# Patient Record
Sex: FEMALE | Race: BLACK OR AFRICAN AMERICAN | NOT HISPANIC OR LATINO | URBAN - METROPOLITAN AREA
[De-identification: names, ages, dates, MRNs, and addresses within clinical notes are randomized per-mention and may not be internally consistent; named-entity substitution may affect disease eponyms.]

---

## 2024-10-18 ENCOUNTER — EMERGENCY (EMERGENCY)
Facility: HOSPITAL | Age: 34
LOS: 1 days | Discharge: HOME | End: 2024-10-18
Attending: EMERGENCY MEDICINE | Admitting: EMERGENCY MEDICINE
Payer: COMMERCIAL

## 2024-10-18 VITALS
RESPIRATION RATE: 16 BRPM | DIASTOLIC BLOOD PRESSURE: 76 MMHG | HEART RATE: 58 BPM | SYSTOLIC BLOOD PRESSURE: 110 MMHG | OXYGEN SATURATION: 99 % | TEMPERATURE: 98 F

## 2024-10-18 VITALS
SYSTOLIC BLOOD PRESSURE: 115 MMHG | WEIGHT: 147.93 LBS | HEART RATE: 70 BPM | HEIGHT: 72 IN | TEMPERATURE: 98 F | RESPIRATION RATE: 16 BRPM | OXYGEN SATURATION: 97 % | DIASTOLIC BLOOD PRESSURE: 72 MMHG

## 2024-10-18 LAB
ALBUMIN SERPL ELPH-MCNC: 3.8 G/DL — SIGNIFICANT CHANGE UP (ref 3.3–5)
ALP SERPL-CCNC: 65 U/L — SIGNIFICANT CHANGE UP (ref 40–120)
ALT FLD-CCNC: 21 U/L — SIGNIFICANT CHANGE UP (ref 4–33)
ANION GAP SERPL CALC-SCNC: 11 MMOL/L — SIGNIFICANT CHANGE UP (ref 7–14)
APPEARANCE UR: ABNORMAL
AST SERPL-CCNC: 20 U/L — SIGNIFICANT CHANGE UP (ref 4–32)
BACTERIA # UR AUTO: ABNORMAL /HPF
BASOPHILS # BLD AUTO: 0.03 K/UL — SIGNIFICANT CHANGE UP (ref 0–0.2)
BASOPHILS NFR BLD AUTO: 0.4 % — SIGNIFICANT CHANGE UP (ref 0–2)
BILIRUB SERPL-MCNC: 0.6 MG/DL — SIGNIFICANT CHANGE UP (ref 0.2–1.2)
BILIRUB UR-MCNC: NEGATIVE — SIGNIFICANT CHANGE UP
BUN SERPL-MCNC: 11 MG/DL — SIGNIFICANT CHANGE UP (ref 7–23)
CALCIUM SERPL-MCNC: 9.7 MG/DL — SIGNIFICANT CHANGE UP (ref 8.4–10.5)
CAST: 0 /LPF — SIGNIFICANT CHANGE UP (ref 0–4)
CHLORIDE SERPL-SCNC: 102 MMOL/L — SIGNIFICANT CHANGE UP (ref 98–107)
CO2 SERPL-SCNC: 22 MMOL/L — SIGNIFICANT CHANGE UP (ref 22–31)
COLOR SPEC: YELLOW — SIGNIFICANT CHANGE UP
CREAT SERPL-MCNC: 0.61 MG/DL — SIGNIFICANT CHANGE UP (ref 0.5–1.3)
DIFF PNL FLD: NEGATIVE — SIGNIFICANT CHANGE UP
EGFR: 121 ML/MIN/1.73M2 — SIGNIFICANT CHANGE UP
EOSINOPHIL # BLD AUTO: 0.18 K/UL — SIGNIFICANT CHANGE UP (ref 0–0.5)
EOSINOPHIL NFR BLD AUTO: 2.4 % — SIGNIFICANT CHANGE UP (ref 0–6)
GLUCOSE SERPL-MCNC: 81 MG/DL — SIGNIFICANT CHANGE UP (ref 70–99)
GLUCOSE UR QL: NEGATIVE MG/DL — SIGNIFICANT CHANGE UP
HCG SERPL-ACNC: SIGNIFICANT CHANGE UP MIU/ML
HCT VFR BLD CALC: 34.4 % — LOW (ref 34.5–45)
HGB BLD-MCNC: 11.9 G/DL — SIGNIFICANT CHANGE UP (ref 11.5–15.5)
IANC: 4.12 K/UL — SIGNIFICANT CHANGE UP (ref 1.8–7.4)
IMM GRANULOCYTES NFR BLD AUTO: 0.3 % — SIGNIFICANT CHANGE UP (ref 0–0.9)
KETONES UR-MCNC: NEGATIVE MG/DL — SIGNIFICANT CHANGE UP
LEUKOCYTE ESTERASE UR-ACNC: NEGATIVE — SIGNIFICANT CHANGE UP
LYMPHOCYTES # BLD AUTO: 2.07 K/UL — SIGNIFICANT CHANGE UP (ref 1–3.3)
LYMPHOCYTES # BLD AUTO: 27.7 % — SIGNIFICANT CHANGE UP (ref 13–44)
MAGNESIUM SERPL-MCNC: 2 MG/DL — SIGNIFICANT CHANGE UP (ref 1.6–2.6)
MCHC RBC-ENTMCNC: 27 PG — SIGNIFICANT CHANGE UP (ref 27–34)
MCHC RBC-ENTMCNC: 34.6 GM/DL — SIGNIFICANT CHANGE UP (ref 32–36)
MCV RBC AUTO: 78 FL — LOW (ref 80–100)
MONOCYTES # BLD AUTO: 1.06 K/UL — HIGH (ref 0–0.9)
MONOCYTES NFR BLD AUTO: 14.2 % — HIGH (ref 2–14)
NEUTROPHILS # BLD AUTO: 4.12 K/UL — SIGNIFICANT CHANGE UP (ref 1.8–7.4)
NEUTROPHILS NFR BLD AUTO: 55 % — SIGNIFICANT CHANGE UP (ref 43–77)
NITRITE UR-MCNC: NEGATIVE — SIGNIFICANT CHANGE UP
NRBC # BLD: 0 /100 WBCS — SIGNIFICANT CHANGE UP (ref 0–0)
NRBC # FLD: 0 K/UL — SIGNIFICANT CHANGE UP (ref 0–0)
PH UR: 5.5 — SIGNIFICANT CHANGE UP (ref 5–8)
PHOSPHATE SERPL-MCNC: 4.6 MG/DL — HIGH (ref 2.5–4.5)
PLATELET # BLD AUTO: 377 K/UL — SIGNIFICANT CHANGE UP (ref 150–400)
POTASSIUM SERPL-MCNC: 4 MMOL/L — SIGNIFICANT CHANGE UP (ref 3.5–5.3)
POTASSIUM SERPL-SCNC: 4 MMOL/L — SIGNIFICANT CHANGE UP (ref 3.5–5.3)
PROT SERPL-MCNC: 6.5 G/DL — SIGNIFICANT CHANGE UP (ref 6–8.3)
PROT UR-MCNC: NEGATIVE MG/DL — SIGNIFICANT CHANGE UP
RBC # BLD: 4.41 M/UL — SIGNIFICANT CHANGE UP (ref 3.8–5.2)
RBC # FLD: 14.3 % — SIGNIFICANT CHANGE UP (ref 10.3–14.5)
RBC CASTS # UR COMP ASSIST: 1 /HPF — SIGNIFICANT CHANGE UP (ref 0–4)
SODIUM SERPL-SCNC: 135 MMOL/L — SIGNIFICANT CHANGE UP (ref 135–145)
SP GR SPEC: 1.02 — SIGNIFICANT CHANGE UP (ref 1–1.03)
SQUAMOUS # UR AUTO: 11 /HPF — HIGH (ref 0–5)
UROBILINOGEN FLD QL: 0.2 MG/DL — SIGNIFICANT CHANGE UP (ref 0.2–1)
WBC # BLD: 7.48 K/UL — SIGNIFICANT CHANGE UP (ref 3.8–10.5)
WBC # FLD AUTO: 7.48 K/UL — SIGNIFICANT CHANGE UP (ref 3.8–10.5)
WBC UR QL: 2 /HPF — SIGNIFICANT CHANGE UP (ref 0–5)

## 2024-10-18 PROCEDURE — 76705 ECHO EXAM OF ABDOMEN: CPT | Mod: 26

## 2024-10-18 PROCEDURE — 99284 EMERGENCY DEPT VISIT MOD MDM: CPT

## 2024-10-18 PROCEDURE — 76817 TRANSVAGINAL US OBSTETRIC: CPT | Mod: 26

## 2024-10-18 RX ORDER — ACETAMINOPHEN 325 MG
1000 TABLET ORAL ONCE
Refills: 0 | Status: COMPLETED | OUTPATIENT
Start: 2024-10-18 | End: 2024-10-18

## 2024-10-18 RX ADMIN — Medication 400 MILLIGRAM(S): at 17:19

## 2024-10-18 NOTE — ED PROVIDER NOTE - PATIENT PORTAL LINK FT
You can access the FollowMyHealth Patient Portal offered by [facility] by registering at the following website: [email or web address]. By joining [employer]’s FollowMyHealth portal, you will also be able to view your health information using other applications (apps) compatible with our system.

## 2024-10-18 NOTE — ED PROVIDER NOTE - PROGRESS NOTE DETAILS
exam chaperoned by SHITAL Harrell.  Liberty Turk PGY1 Patient reassessed.  Patient endorsing reduced pelvic pain.  Patient endorses an appetite.  Patient without signs of anemia, infection, or electrolyte derangements on her blood work.  Patient with large right sided subserosal fibroid likely contributing to patient's pelvic pain.  As patient does not have a fever, decreased appetite/lack of appetite or leukocytosis low suspicion for appendicitis, recognize that appendix was not able to be visualized on ultrasound however shared decision making and have decided pt will not require further imaging/MRI to rule out appendicitis.  Results discussed with patient. Pt is afebrile currently, vitals are stable. Pt was educated on outpatient treatment, follow up and return precautions. Shared decision making performed. Pt okay for DC home at this time. Questions answered. Pt understands and agrees with the plan for discharge home. Pt has access to appropriate follow up.   Liberty Turk PGY1

## 2024-10-18 NOTE — ED PROVIDER NOTE - OBJECTIVE STATEMENT
33 y/o F PMHx of asthma, fibroids,  9 wk 2 days presenting with sudden onset right lower quadrant and right pelvic pain that began around 1 pm this afternoon. LMP 24, RANCHO 25 Pt denies fever, decreased PO intake, nausea, vomiting, vaginal bleeding or discharge, hx of STI, CP, SOB, dizziness, 33 y/o F PMHx of asthma, fibroids,  9 wk 2 days presenting with sudden onset of constant right lower quadrant and right pelvic pain that began around 1 pm this afternoon. LMP 24, RANCHO 25 Pt denies fever, decreased PO intake, nausea, vomiting, vaginal bleeding or discharge, hx of STI, CP, SOB, dizziness, dysuria, and hematuria. 35 y/o F PMHx of asthma, fibroids,  9 wk 2 days presenting with sudden onset of constant right lower quadrant and right pelvic pain that began around 1 pm this afternoon. LMP 24, RANCHO 25 Pt denies fever, decreased PO intake, nausea, vomiting, vaginal bleeding or discharge, hx of STI, CP, SOB, dizziness, dysuria, and hematuria.    Attendinyo female presents with right sided pelvic pain which was sudden onset this afternoon.  no fever or chills.  has had decreased appetite for awhile but pt is pregnant in the first trimester.

## 2024-10-18 NOTE — ED ADULT TRIAGE NOTE - WEIGHT METHOD
Instructions: This plan will send the code FBSE to the PM system.  DO NOT or CHANGE the price.
Price (Do Not Change): 0.00
Detail Level: Simple
stated

## 2024-10-18 NOTE — ED ADULT TRIAGE NOTE - NS ED TRIAGE AVPU SCALE
Alert-The patient is alert, awake and responds to voice. The patient is oriented to time, place, and person. The triage nurse is able to obtain subjective information. occasional use

## 2024-10-18 NOTE — ED PROVIDER NOTE - CLINICAL SUMMARY MEDICAL DECISION MAKING FREE TEXT BOX
Right lower abdominal pain.  will get US of the appendix to evaluate for appendicitis.  will get transvaginal US to evaluate the fetus and to evaluate for ovarian torsion.  will treat pain and reassess.

## 2024-10-18 NOTE — ED ADULT TRIAGE NOTE - CHIEF COMPLAINT QUOTE
c/o intermittent sharp pains to right pelvic area. Pt is 9 wks pregnant, LMP 24. . Denies any vaginal bleeding or abnormal discharge. Phx fibroids.

## 2024-10-18 NOTE — ED ADULT NURSE NOTE - NSFALLUNIVINTERV_ED_ALL_ED
Bed/Stretcher in lowest position, wheels locked, appropriate side rails in place/Call bell, personal items and telephone in reach/Instruct patient to call for assistance before getting out of bed/chair/stretcher/Non-slip footwear applied when patient is off stretcher/[place] to call system/Physically safe environment - no spills, clutter or unnecessary equipment/Purposeful proactive rounding/Room/bathroom lighting operational, light cord in reach

## 2024-10-18 NOTE — ED ADULT NURSE NOTE - DOES PATIENT HAVE ADVANCE DIRECTIVE
Patient Instructions by Zeferino Culp DO at 05/02/17 11:58 AM     Author:  Zeferino Culp DO Service:  (none) Author Type:  Physician     Filed:  05/02/17 11:59 AM Encounter Date:  5/2/2017 Status:  Signed     :  Zeferino Culp DO (Physician)            PATIENT INFORMATION   .    Follow-Up  -- Make an appointment with Zeferino Culp DO in one month  -- Talk to Cardiologist about midodrine dosing  -- WIll send records to Dr Lee    Additional Educational Resources:  For additional resources regarding your symptoms, diagnosis, or further health information, please visit the Health Resources section on [email or web address] or the Online Health Resources section in [employer].            Revision History        User Key Date/Time User Provider Type Action    > [N/A] 05/02/17 11:59 AM Zeferino Culp DO Physician Sign            
Yes

## 2024-10-18 NOTE — ED PROVIDER NOTE - NSFOLLOWUPINSTRUCTIONS_ED_ALL_ED_FT
Thank you for coming to the Emergency Department.    You were seen today for pelvic pain. We obtained lab work and imaging to help determine what was causing your symptoms. On ultrasound you were found to have a trace subchorionic hemorrhage as well as a large subserosal right fundal fibroid.       Experience some vaginal spotting as a result of this.  Reach out to your OB/GYN or come to the emergency department if you experience this.  Avoid heavy lifting, exertional physical activity, sexual activity until your OB/GYN has improved.    Based on our examination we have determined that there is no immediate danger to your health at this time.    We would like you to follow up with your OBGYN within 1 week.     You can take Tylenol for pain.     Acetaminophen 650mg every 6 hours as needed for pain.    Please do not exceed 4,000 mg of acetaminophen during a 24 hours period.  Acetaminophen can be found in many over-the-counter cold medications as well as  opioid medications that may be given for pain.     PLEASE RETURN TO THE EMERGENCY DEPARTMENT and call 911 IF YOU EXPERIENCE ANY OF THE FOLLOWING SYMPTOMS: Vaginal bleeding, worsening pelvic pain that does not resolve with Tylenol, fevers, burning with urination.

## 2024-10-18 NOTE — ED ADULT NURSE NOTE - OBJECTIVE STATEMENT
Pt received in Intake 8 . Fiance at bedside. Pt calm pleasant affect. Pt st" I have this pain in rt lower abd that came on suddenly this afternoon....it is a constant , sharp pain. I am pregnant had ultrasound done  10/7 I was 7 weeks pregnant according to ultrasound. My last cycle was 8/14. I have no bleeding. No unusually nausea/no vomiting."  Urine cup provide. Pt positioned for comfort. vss . Further eval to follow.

## 2024-10-18 NOTE — ED ADULT NURSE REASSESSMENT NOTE - NS ED NURSE REASSESS COMMENT FT1
IV initiated on right AC g 20 blood work sent to lab, due meds given. Patient taken to ultrasound per wheelchair.

## 2024-10-19 LAB
CULTURE RESULTS: SIGNIFICANT CHANGE UP
SPECIMEN SOURCE: SIGNIFICANT CHANGE UP

## 2024-12-31 ENCOUNTER — OUTPATIENT (OUTPATIENT)
Dept: INPATIENT UNIT | Facility: HOSPITAL | Age: 34
LOS: 1 days | Discharge: HOME | End: 2024-12-31
Payer: COMMERCIAL

## 2024-12-31 ENCOUNTER — EMERGENCY (EMERGENCY)
Facility: HOSPITAL | Age: 34
LOS: 1 days | Discharge: STILL ADMITTED OR ADMINISTRATIVE | End: 2024-12-31
Admitting: EMERGENCY MEDICINE
Payer: COMMERCIAL

## 2024-12-31 VITALS
SYSTOLIC BLOOD PRESSURE: 104 MMHG | DIASTOLIC BLOOD PRESSURE: 75 MMHG | TEMPERATURE: 98 F | RESPIRATION RATE: 16 BRPM | HEART RATE: 96 BPM

## 2024-12-31 VITALS
HEART RATE: 71 BPM | DIASTOLIC BLOOD PRESSURE: 77 MMHG | TEMPERATURE: 98 F | SYSTOLIC BLOOD PRESSURE: 107 MMHG | RESPIRATION RATE: 16 BRPM | OXYGEN SATURATION: 100 % | WEIGHT: 154.98 LBS

## 2024-12-31 DIAGNOSIS — O26.899 OTHER SPECIFIED PREGNANCY RELATED CONDITIONS, UNSPECIFIED TRIMESTER: ICD-10-CM

## 2024-12-31 DIAGNOSIS — L98.9 DISORDER OF THE SKIN AND SUBCUTANEOUS TISSUE, UNSPECIFIED: Chronic | ICD-10-CM

## 2024-12-31 LAB
APPEARANCE UR: CLEAR — SIGNIFICANT CHANGE UP
BILIRUB UR-MCNC: NEGATIVE — SIGNIFICANT CHANGE UP
COLOR SPEC: YELLOW — SIGNIFICANT CHANGE UP
DIFF PNL FLD: NEGATIVE — SIGNIFICANT CHANGE UP
GLUCOSE UR QL: NEGATIVE MG/DL — SIGNIFICANT CHANGE UP
KETONES UR-MCNC: ABNORMAL MG/DL
LEUKOCYTE ESTERASE UR-ACNC: NEGATIVE — SIGNIFICANT CHANGE UP
NITRITE UR-MCNC: NEGATIVE — SIGNIFICANT CHANGE UP
PH UR: 5.5 — SIGNIFICANT CHANGE UP (ref 5–8)
PROT UR-MCNC: NEGATIVE MG/DL — SIGNIFICANT CHANGE UP
SP GR SPEC: 1.02 — SIGNIFICANT CHANGE UP (ref 1–1.03)
UROBILINOGEN FLD QL: 0.2 MG/DL — SIGNIFICANT CHANGE UP (ref 0.2–1)

## 2024-12-31 PROCEDURE — 99221 1ST HOSP IP/OBS SF/LOW 40: CPT

## 2024-12-31 PROCEDURE — 99283 EMERGENCY DEPT VISIT LOW MDM: CPT

## 2024-12-31 RX ORDER — ALBUTEROL SULFATE 90 UG/1
0 INHALANT RESPIRATORY (INHALATION)
Refills: 0 | DISCHARGE

## 2024-12-31 RX ORDER — SODIUM CHLORIDE 9 MG/ML
1000 INJECTION, SOLUTION INTRAVENOUS ONCE
Refills: 0 | Status: COMPLETED | OUTPATIENT
Start: 2024-12-31 | End: 2024-12-31

## 2024-12-31 RX ADMIN — SODIUM CHLORIDE 1000 MILLILITER(S): 9 INJECTION, SOLUTION INTRAVENOUS at 22:57

## 2024-12-31 NOTE — OB PROVIDER TRIAGE NOTE - HISTORY OF PRESENT ILLNESS
34 year old female  @ 19.6GA with SLIUP, hx fibroids, presents to triage c/o contractions. pt reports contractions since yesterday - described as tightness, about q 10 minutes, rated 9/10 in pain, defers pain meds.  reports feeling FM. denies vaginal bleeding, leakage of fluid.   Denies fever, chills, headaches, changes in vision, chest pain, palpitations, shortness of breath, cough, nausea, vomiting, diarrhea, constipation, urinary symptoms, edema.   PNC with Dr Escudero - [facility]. last visit 24. next visit 25.  PNC complications   1. fibroids - uncomplicated  2. asthma - last symptoms/albuterol use 10/2024. denies relate hospitalizations/intubations.     HIE 10/18/24  Multiple small intramural fibroids. A subserosal right fundal fibroid of 5.2 x 4.9 x 5 cm.     34 year old female  @ 19.6GA with SLIUP, hx fibroids, presents to triage c/o contractions. pt reports contractions since yesterday - described as tightness worse on right side, about q 10 minutes, rated 9/10 in pain, defers pain meds.  reports feeling FM. denies vaginal bleeding, leakage of fluid.   Denies fever, chills, headaches, changes in vision, chest pain, palpitations, shortness of breath, cough, nausea, vomiting, diarrhea, constipation, urinary symptoms, edema.   PNC with Dr Escudero - [facility]. last visit 24. next visit 25.  PNC complications   1. fibroids - uncomplicated  2. asthma - last symptoms/albuterol use 10/2024. denies relate hospitalizations/intubations.     HIE 10/18/24  Multiple small intramural fibroids. A subserosal right fundal fibroid of 5.2 x 4.9 x 5 cm.

## 2024-12-31 NOTE — OB PROVIDER TRIAGE NOTE - ADDITIONAL INSTRUCTIONS
Please follow up with your OB provider within 1 week. You may take Tylenol, Benadryl as needed for pain

## 2024-12-31 NOTE — ED PROVIDER NOTE - CLINICAL SUMMARY MEDICAL DECISION MAKING FREE TEXT BOX
Attending Dr. CARRILLO:  Pt was seen and had a medical screening exam prior to disposition to L&D. Pt is a 34 female , 20 weeks gestation, who presented to the ED for pelvic pain since yesterday. No acute concerns at this time with no signs currently of imminent delivery or hemorrhage. L&D notified and will discharge directly into their care for continued evaluation and definitive care. Patient brought by staff to L&D.

## 2024-12-31 NOTE — OB PROVIDER TRIAGE NOTE - NSOBPROVIDERNOTE_OBGYN_ALL_OB_FT
34 year old female  @ 19.6GA with SLIUP, hx fibroids, presents to triage c/o contractions    UA sent  TOCO  TAS - anterior, breech, GFM, m mode 133 bpm, MVP 4.3   Fibroid on right fundal, 5.19 x 4.4cm  SSE cervix appears closed. no VB/LOF.  TVS 3.75-4.04cm, no funneling/dynamic changes 34 year old female  @ 19.6GA with SLIUP, hx fibroids, presents to triage c/o contractions    UA sent  TOCO irritability vs irreg contractions  TAS - anterior, breech, GFM, m mode 133 bpm, MVP 4.3   Fibroid on right fundal, 5.19 x 4.4cm  SSE cervix appears closed. no VB/LOF.  TVS 3.75-4.04cm, no funneling/dynamic changes    d/w Dr Baugh Safety Attending in triage, reviewed plan of care: give IV LR bolus 34 year old female  @ 19.6GA with SLIUP, hx fibroids, presents to triage c/o contractions    UA sent  TOCO irritability vs irreg contractions  TAS - anterior, breech, GFM, m mode 133 bpm, MVP 4.3   Fibroid on right fundal, 5.19 x 4.4cm  SSE cervix appears closed. no VB/LOF.  TVS 3.75-4.04cm, no funneling/dynamic changes    d/w Dr Baugh Safety Attending in triage, reviewed plan of care: give IV LR bolus   - discussed indocin with patient. pt considering 34 year old female  @ 19.6GA with SLIUP, hx fibroids, presents to triage c/o contractions    UA sent  TOCO irritability vs irreg contractions  TAS - anterior, breech, GFM, m mode 133 bpm, MVP 4.3   Fibroid on right fundal, 5.19 x 4.4cm  SSE cervix appears closed. no VB/LOF.  TVS 3.75-4.04cm, no funneling/dynamic changes    d/w Dr Baugh Safety Attending in triage, reviewed plan of care: give IV LR bolus   - discussed indocin with patient. pt considering    Care taken over by Dr. Baugh  -pt re-evaluated - pt reports feeling intermittent pain but it has improved while lying on her side. Pt initially sleeping  -pt declines indocin  -UA with trace ketones  -toco with improved irritability, no further ctx  -given CL long and visually closed and improved toco, do not suspect PTL   -s/p 1L IVF bolus    Ok to dc to home in stable condition. Recommend patient follow up with OBGYN provider in 1 week    HERB Baugh MD

## 2024-12-31 NOTE — OB PROVIDER TRIAGE NOTE - NSHPPHYSICALEXAM_GEN_ALL_CORE
Vital Signs Last 24 Hrs  T(C): 36.9 (31 Dec 2024 21:27), Max: 36.9 (31 Dec 2024 20:53)  T(F): 98.42 (31 Dec 2024 21:27), Max: 98.42 (31 Dec 2024 21:27)  HR: 67 (31 Dec 2024 22:01) (67 - 96)  BP: 111/76 (31 Dec 2024 22:01) (104/75 - 117/68)  BP(mean): --  RR: 16 (31 Dec 2024 21:27) (16 - 16)  SpO2: 100% (31 Dec 2024 20:18) (100% - 100%)    gen: a/o x 3, NAD  pulm: breathing unlabored on room air  abd: soft and nontender, gravid  neg CAV tenderness  SSE: cervix appears closed. no LOF/VB noted  TVS: cervical length 3.75-4.04cm, no funneling/dynamic changes   SVE: deferred  TAS: limited sonogram -breech by sono. anterior placenta. GFM. m mode 133  bpm. MVP 4.3cm. myoma right fundal 5.19 x 4.4cm. image saved in ASOB  TOCO: irreg contractions Vital Signs Last 24 Hrs  T(C): 36.9 (31 Dec 2024 21:27), Max: 36.9 (31 Dec 2024 20:53)  T(F): 98.42 (31 Dec 2024 21:27), Max: 98.42 (31 Dec 2024 21:27)  HR: 67 (31 Dec 2024 22:01) (67 - 96)  BP: 111/76 (31 Dec 2024 22:01) (104/75 - 117/68)  BP(mean): --  RR: 16 (31 Dec 2024 21:27) (16 - 16)  SpO2: 100% (31 Dec 2024 20:18) (100% - 100%)    gen: a/o x 3, NAD  pulm: breathing unlabored on room air  abd: soft and nontender, gravid. no rebound/guarding noted  neg CAV tenderness  SSE: cervix appears closed. no LOF/VB noted  TVS: cervical length 3.75-4.04cm, no funneling/dynamic changes   SVE: deferred  TAS: limited sonogram -breech by sono. anterior placenta. GFM. m mode 133  bpm. MVP 4.3cm. myoma right fundal 5.19 x 4.4cm. image saved in ASOB  TOCO: irreg contractions

## 2024-12-31 NOTE — ED ADULT TRIAGE NOTE - CHIEF COMPLAINT QUOTE
Pt c/o constant pelvic pain since yesterday. Denies bleeding, vaginal leakage. Pt is 20 weeks pregnant, , RANCHO 25, Dr. Escudero is OB. Pt c/o constant pelvic pain since yesterday. Denies bleeding, vaginal leakage. Pt is 20 weeks pregnant, , RANCHO 25, Dr. Escudero is OB. Dr. Coker evaluated patient in triage and is safe for transport. Spoke to Scott in L&D.

## 2025-01-01 VITALS — DIASTOLIC BLOOD PRESSURE: 69 MMHG | HEART RATE: 82 BPM | SYSTOLIC BLOOD PRESSURE: 119 MMHG

## 2025-01-06 DIAGNOSIS — J45.909 UNSPECIFIED ASTHMA, UNCOMPLICATED: ICD-10-CM

## 2025-01-06 DIAGNOSIS — D25.2 SUBSEROSAL LEIOMYOMA OF UTERUS: ICD-10-CM

## 2025-01-06 DIAGNOSIS — O99.512 DISEASES OF THE RESPIRATORY SYSTEM COMPLICATING PREGNANCY, SECOND TRIMESTER: ICD-10-CM

## 2025-01-06 DIAGNOSIS — Z3A.19 19 WEEKS GESTATION OF PREGNANCY: ICD-10-CM

## 2025-01-06 DIAGNOSIS — O34.12 MATERNAL CARE FOR BENIGN TUMOR OF CORPUS UTERI, SECOND TRIMESTER: ICD-10-CM

## 2025-01-06 DIAGNOSIS — O47.02 FALSE LABOR BEFORE 37 COMPLETED WEEKS OF GESTATION, SECOND TRIMESTER: ICD-10-CM

## 2025-01-10 PROBLEM — J45.909 UNSPECIFIED ASTHMA, UNCOMPLICATED: Chronic | Status: ACTIVE | Noted: 2024-12-31

## 2025-01-10 PROBLEM — Z00.00 ENCOUNTER FOR PREVENTIVE HEALTH EXAMINATION: Status: ACTIVE | Noted: 2025-01-10

## 2025-01-21 ENCOUNTER — ASOB RESULT (OUTPATIENT)
Age: 35
End: 2025-01-21

## 2025-01-21 ENCOUNTER — APPOINTMENT (OUTPATIENT)
Dept: ANTEPARTUM | Facility: CLINIC | Age: 35
End: 2025-01-21
Payer: COMMERCIAL

## 2025-01-21 ENCOUNTER — APPOINTMENT (OUTPATIENT)
Dept: MATERNAL FETAL MEDICINE | Facility: CLINIC | Age: 35
End: 2025-01-21
Payer: COMMERCIAL

## 2025-01-21 VITALS
WEIGHT: 157 LBS | DIASTOLIC BLOOD PRESSURE: 71 MMHG | BODY MASS INDEX: 23.79 KG/M2 | HEIGHT: 68 IN | HEART RATE: 87 BPM | SYSTOLIC BLOOD PRESSURE: 112 MMHG

## 2025-01-21 PROCEDURE — 76811 OB US DETAILED SNGL FETUS: CPT

## 2025-01-21 PROCEDURE — 99204 OFFICE O/P NEW MOD 45 MIN: CPT

## 2025-02-21 ENCOUNTER — ASOB RESULT (OUTPATIENT)
Age: 35
End: 2025-02-21

## 2025-02-21 ENCOUNTER — APPOINTMENT (OUTPATIENT)
Dept: ANTEPARTUM | Facility: CLINIC | Age: 35
End: 2025-02-21

## 2025-02-21 ENCOUNTER — OUTPATIENT (OUTPATIENT)
Dept: INPATIENT UNIT | Facility: HOSPITAL | Age: 35
LOS: 1 days | Discharge: HOME | End: 2025-02-21
Payer: COMMERCIAL

## 2025-02-21 VITALS — OXYGEN SATURATION: 96 % | HEART RATE: 88 BPM

## 2025-02-21 DIAGNOSIS — O26.899 OTHER SPECIFIED PREGNANCY RELATED CONDITIONS, UNSPECIFIED TRIMESTER: ICD-10-CM

## 2025-02-21 DIAGNOSIS — L98.9 DISORDER OF THE SKIN AND SUBCUTANEOUS TISSUE, UNSPECIFIED: Chronic | ICD-10-CM

## 2025-02-21 LAB
ALBUMIN SERPL ELPH-MCNC: 3.2 G/DL — LOW (ref 3.3–5)
ALP SERPL-CCNC: 155 U/L — HIGH (ref 40–120)
ALT FLD-CCNC: 29 U/L — SIGNIFICANT CHANGE UP (ref 4–33)
ANION GAP SERPL CALC-SCNC: 13 MMOL/L — SIGNIFICANT CHANGE UP (ref 7–14)
APPEARANCE UR: ABNORMAL
AST SERPL-CCNC: 35 U/L — HIGH (ref 4–32)
BACTERIA # UR AUTO: NEGATIVE /HPF — SIGNIFICANT CHANGE UP
BASOPHILS # BLD AUTO: 0.02 K/UL — SIGNIFICANT CHANGE UP (ref 0–0.2)
BASOPHILS NFR BLD AUTO: 0.3 % — SIGNIFICANT CHANGE UP (ref 0–2)
BILIRUB SERPL-MCNC: 0.4 MG/DL — SIGNIFICANT CHANGE UP (ref 0.2–1.2)
BILIRUB UR-MCNC: NEGATIVE — SIGNIFICANT CHANGE UP
BUN SERPL-MCNC: 7 MG/DL — SIGNIFICANT CHANGE UP (ref 7–23)
CALCIUM SERPL-MCNC: 9.2 MG/DL — SIGNIFICANT CHANGE UP (ref 8.4–10.5)
CAST: 0 /LPF — SIGNIFICANT CHANGE UP (ref 0–4)
CHLORIDE SERPL-SCNC: 103 MMOL/L — SIGNIFICANT CHANGE UP (ref 98–107)
CO2 SERPL-SCNC: 22 MMOL/L — SIGNIFICANT CHANGE UP (ref 22–31)
COLOR SPEC: YELLOW — SIGNIFICANT CHANGE UP
CREAT SERPL-MCNC: 0.62 MG/DL — SIGNIFICANT CHANGE UP (ref 0.5–1.3)
DIFF PNL FLD: NEGATIVE — SIGNIFICANT CHANGE UP
EGFR: 121 ML/MIN/1.73M2 — SIGNIFICANT CHANGE UP
EOSINOPHIL # BLD AUTO: 0.26 K/UL — SIGNIFICANT CHANGE UP (ref 0–0.5)
EOSINOPHIL NFR BLD AUTO: 3.9 % — SIGNIFICANT CHANGE UP (ref 0–6)
FLUAV AG NPH QL: SIGNIFICANT CHANGE UP
FLUBV AG NPH QL: SIGNIFICANT CHANGE UP
GLUCOSE SERPL-MCNC: 98 MG/DL — SIGNIFICANT CHANGE UP (ref 70–99)
GLUCOSE UR QL: NEGATIVE MG/DL — SIGNIFICANT CHANGE UP
HCT VFR BLD CALC: 25.9 % — LOW (ref 34.5–45)
HGB BLD-MCNC: 8.5 G/DL — LOW (ref 11.5–15.5)
IANC: 4.05 K/UL — SIGNIFICANT CHANGE UP (ref 1.8–7.4)
IMM GRANULOCYTES NFR BLD AUTO: 0.8 % — SIGNIFICANT CHANGE UP (ref 0–0.9)
KETONES UR-MCNC: NEGATIVE MG/DL — SIGNIFICANT CHANGE UP
LEUKOCYTE ESTERASE UR-ACNC: NEGATIVE — SIGNIFICANT CHANGE UP
LYMPHOCYTES # BLD AUTO: 1.42 K/UL — SIGNIFICANT CHANGE UP (ref 1–3.3)
LYMPHOCYTES # BLD AUTO: 21.5 % — SIGNIFICANT CHANGE UP (ref 13–44)
MCHC RBC-ENTMCNC: 24.7 PG — LOW (ref 27–34)
MCHC RBC-ENTMCNC: 32.8 G/DL — SIGNIFICANT CHANGE UP (ref 32–36)
MCV RBC AUTO: 75.3 FL — LOW (ref 80–100)
MONOCYTES # BLD AUTO: 0.81 K/UL — SIGNIFICANT CHANGE UP (ref 0–0.9)
MONOCYTES NFR BLD AUTO: 12.3 % — SIGNIFICANT CHANGE UP (ref 2–14)
NEUTROPHILS # BLD AUTO: 4.05 K/UL — SIGNIFICANT CHANGE UP (ref 1.8–7.4)
NEUTROPHILS NFR BLD AUTO: 61.2 % — SIGNIFICANT CHANGE UP (ref 43–77)
NITRITE UR-MCNC: NEGATIVE — SIGNIFICANT CHANGE UP
NRBC # BLD AUTO: 0 K/UL — SIGNIFICANT CHANGE UP (ref 0–0)
NRBC # FLD: 0 K/UL — SIGNIFICANT CHANGE UP (ref 0–0)
NRBC BLD AUTO-RTO: 0 /100 WBCS — SIGNIFICANT CHANGE UP (ref 0–0)
PH UR: 6 — SIGNIFICANT CHANGE UP (ref 5–8)
PLATELET # BLD AUTO: 409 K/UL — HIGH (ref 150–400)
POTASSIUM SERPL-MCNC: 3.3 MMOL/L — LOW (ref 3.5–5.3)
POTASSIUM SERPL-SCNC: 3.3 MMOL/L — LOW (ref 3.5–5.3)
PROT SERPL-MCNC: 6.6 G/DL — SIGNIFICANT CHANGE UP (ref 6–8.3)
PROT UR-MCNC: SIGNIFICANT CHANGE UP MG/DL
RBC # BLD: 3.44 M/UL — LOW (ref 3.8–5.2)
RBC # FLD: 14.4 % — SIGNIFICANT CHANGE UP (ref 10.3–14.5)
RBC CASTS # UR COMP ASSIST: 1 /HPF — SIGNIFICANT CHANGE UP (ref 0–4)
REVIEW: SIGNIFICANT CHANGE UP
RSV RNA NPH QL NAA+NON-PROBE: SIGNIFICANT CHANGE UP
SARS-COV-2 RNA SPEC QL NAA+PROBE: SIGNIFICANT CHANGE UP
SODIUM SERPL-SCNC: 138 MMOL/L — SIGNIFICANT CHANGE UP (ref 135–145)
SP GR SPEC: 1.02 — SIGNIFICANT CHANGE UP (ref 1–1.03)
SQUAMOUS # UR AUTO: 4 /HPF — SIGNIFICANT CHANGE UP (ref 0–5)
UROBILINOGEN FLD QL: 1 MG/DL — SIGNIFICANT CHANGE UP (ref 0.2–1)
WBC # BLD: 6.61 K/UL — SIGNIFICANT CHANGE UP (ref 3.8–10.5)
WBC # FLD AUTO: 6.61 K/UL — SIGNIFICANT CHANGE UP (ref 3.8–10.5)
WBC UR QL: 1 /HPF — SIGNIFICANT CHANGE UP (ref 0–5)

## 2025-02-21 PROCEDURE — 71045 X-RAY EXAM CHEST 1 VIEW: CPT | Mod: 26

## 2025-02-21 PROCEDURE — 59025 FETAL NON-STRESS TEST: CPT | Mod: 26

## 2025-02-21 PROCEDURE — 93010 ELECTROCARDIOGRAM REPORT: CPT

## 2025-02-21 PROCEDURE — 99221 1ST HOSP IP/OBS SF/LOW 40: CPT | Mod: 25

## 2025-02-21 RX ORDER — DEXTROMETHORPHAN HBR, GUAIFENESIN 200 MG/10ML
200 LIQUID ORAL EVERY 6 HOURS
Refills: 0 | Status: ACTIVE | OUTPATIENT
Start: 2025-02-21 | End: 2026-01-20

## 2025-02-21 RX ORDER — ONDANSETRON HCL/PF 4 MG/2 ML
4 VIAL (ML) INJECTION ONCE
Refills: 0 | Status: ACTIVE | OUTPATIENT
Start: 2025-02-21

## 2025-02-21 RX ORDER — SODIUM CHLORIDE 9 G/1000ML
1000 INJECTION, SOLUTION INTRAVENOUS ONCE
Refills: 0 | Status: ACTIVE | OUTPATIENT
Start: 2025-02-21 | End: 2025-02-21

## 2025-02-21 RX ADMIN — DEXTROMETHORPHAN HBR, GUAIFENESIN 200 MILLIGRAM(S): 200 LIQUID ORAL at 19:34

## 2025-02-21 NOTE — OB PROVIDER TRIAGE NOTE - HISTORY OF PRESENT ILLNESS
PNC with [facility] @ [facility]   35 y/o  @ 27.2 wks gestation presents with c/o flu like symptoms since Noe 2/15/2025 , states has had a productive cough , SOB,  had temp of 101.8 on  and reports no fever today reports generalized body aches , denies any recent sick exposure , pt c/o dizziness x's 1 day denies any fever or chills today pt nausea " on and off" the past   few days denies any uc's vb or lof reports +FM ap care comp by :   anemia- pt to start iron infusions     PNC with [facility] @ [facility]   33 y/o  @ 27.2 wks gestation presents with c/o flu like symptoms since Noe 2/15/2025 , states has had a productive cough states has had thick brown- yellowish sputum x's 3 days  , SOB,  had temp of 101.8 on  and reports no fever today reports generalized body aches , denies any recent sick exposure , pt c/o dizziness x's 1 day denies any fever or chills today pt nausea " on and off" the past   few days denies any uc's vb or lof reports +FM ap care comp by :   anemia- pt to start iron infusions

## 2025-02-21 NOTE — OB PROVIDER TRIAGE NOTE - NS_LMP_OBGYN_ALL_OB_DT
Implemented All Fall with Harm Risk Interventions:  [place] to call system. Call bell, personal items and telephone within reach. Instruct patient to call for assistance. Room bathroom lighting operational. Non-slip footwear when patient is off stretcher. Physically safe environment: no spills, clutter or unnecessary equipment. Stretcher in lowest position, wheels locked, appropriate side rails in place. Provide visual cue, wrist band, yellow gown, etc. Monitor gait and stability. Monitor for mental status changes and reorient to person, place, and time. Review medications for side effects contributing to fall risk. Reinforce activity limits and safety measures with patient and family. Provide visual clues: red socks.
14-Aug-2024

## 2025-02-21 NOTE — OB RN TRIAGE NOTE - CURRENT PREGNANCY COMPLICATIONS, OB PROFILE
Gestational Age less than 36 Weeks/Anemia/Asthma anemia dx'd 2/21/25 to start iron infusion/Gestational Age less than 36 Weeks/Anemia/Asthma

## 2025-02-21 NOTE — OB PROVIDER TRIAGE NOTE - NSHPPHYSICALEXAM_GEN_ALL_CORE
abdomen: soft, nt on palp  lungs: CTAB  heart : RRR  NST in progress abdomen: soft, nt on palp  lungs: CTAB  heart : RRR  NST in progress  109/66 P: 93 R: 18 T: 37.0 abdomen: soft, nt on palp  lungs: CTAB  heart : RRR  NST in progress  109/66 P: 93 R: 18 T: 37.0  EKG :  normal sinus rhythm   normal ECG abdomen: soft, nt on palp  lungs: CTAB  heart : RRR  NST in progress  109/66 P: 93 R: 18 T: 37.0  EKG :  normal sinus rhythm   normal ECG      Physical Exam  Cardiac: RRR  Pulm: Unlabored, breathing comfortably on room air, coughing, lungs clear   Abdomen: soft, nontender, gravid    TAUS: cephalic, posterior placenta, EVELIO 11.63, BPP 8/8,  bpm via m-mode, images saved in ASOB  EFM: 140bpm/ moderate variability/ +accels, no decels, Reactive NST   [place]: No contractions

## 2025-02-21 NOTE — OB PROVIDER TRIAGE NOTE - NSOBPROVIDERNOTE_OBGYN_ALL_OB_FT
33 y/o   @ 27.2 wks gestation with flu like symptoms: Received handoff report from day shift ACP   33 y/o   @ 27.2 wks gestation with flu like symptoms    -Fetal status reassuring with Reactive NST and BPP /8   -RVP panel negative   -Patient verbalizes slight decrease in cough Received handoff report from day shift ACP   35 y/o   @ 27.2 wks gestation with flu like symptoms    -Fetal status reassuring with Reactive NST and BPP    -RVP panel negative   -Patient verbalizes slight decrease in cough  -Vital signs are stable   -Patient will be notified with chest x-ray results   -Patient cleared for discharge home   -Follow up with OB provider within one week   -Return to triage if symptoms worsen, decreased fetal movement, vaginal leaking, vaginal bleeding, head ache, chest pain, blurred vision dizziness, SOB     D/W Dr. Walt Wynne, NP Received handoff report from day shift ACP   35 y/o   @ 27.2 wks gestation with flu like symptoms    -Fetal status reassuring with Reactive NST and BPP    -RVP panel negative   -Patient verbalizes slight decrease in cough  -Vital signs are stable   -Patient will be notified with chest x-ray results   -Patient has first iron infusion scheduled for   -Patient cleared for discharge home   -Follow up with OB provider within one week   -Return to triage if symptoms worsen, decreased fetal movement, vaginal leaking, vaginal bleeding, head ache, chest pain, blurred vision dizziness, SOB     D/W Dr. Walt Wynne, NP

## 2025-02-22 ENCOUNTER — TRANSCRIPTION ENCOUNTER (OUTPATIENT)
Age: 35
End: 2025-02-22

## 2025-02-24 DIAGNOSIS — Z20.822 CONTACT WITH AND (SUSPECTED) EXPOSURE TO COVID-19: ICD-10-CM

## 2025-02-24 DIAGNOSIS — O99.512 DISEASES OF THE RESPIRATORY SYSTEM COMPLICATING PREGNANCY, SECOND TRIMESTER: ICD-10-CM

## 2025-02-24 DIAGNOSIS — Z3A.27 27 WEEKS GESTATION OF PREGNANCY: ICD-10-CM

## 2025-02-24 DIAGNOSIS — M79.10 MYALGIA, UNSPECIFIED SITE: ICD-10-CM

## 2025-02-24 DIAGNOSIS — D21.9 BENIGN NEOPLASM OF CONNECTIVE AND OTHER SOFT TISSUE, UNSPECIFIED: ICD-10-CM

## 2025-02-24 DIAGNOSIS — J45.909 UNSPECIFIED ASTHMA, UNCOMPLICATED: ICD-10-CM

## 2025-02-24 DIAGNOSIS — R09.3 ABNORMAL SPUTUM: ICD-10-CM

## 2025-02-24 DIAGNOSIS — R42 DIZZINESS AND GIDDINESS: ICD-10-CM

## 2025-02-24 DIAGNOSIS — D57.3 SICKLE-CELL TRAIT: ICD-10-CM

## 2025-02-24 DIAGNOSIS — O26.892 OTHER SPECIFIED PREGNANCY RELATED CONDITIONS, SECOND TRIMESTER: ICD-10-CM

## 2025-02-24 DIAGNOSIS — R05.9 COUGH, UNSPECIFIED: ICD-10-CM

## 2025-02-24 DIAGNOSIS — O99.012 ANEMIA COMPLICATING PREGNANCY, SECOND TRIMESTER: ICD-10-CM

## 2025-02-24 DIAGNOSIS — O99.891 OTHER SPECIFIED DISEASES AND CONDITIONS COMPLICATING PREGNANCY: ICD-10-CM

## 2025-02-25 ENCOUNTER — APPOINTMENT (OUTPATIENT)
Dept: ANTEPARTUM | Facility: CLINIC | Age: 35
End: 2025-02-25

## 2025-02-25 ENCOUNTER — ASOB RESULT (OUTPATIENT)
Age: 35
End: 2025-02-25

## 2025-02-25 PROBLEM — D64.9 ANEMIA, UNSPECIFIED: Chronic | Status: ACTIVE | Noted: 2025-02-21

## 2025-02-25 PROBLEM — D21.9 BENIGN NEOPLASM OF CONNECTIVE AND OTHER SOFT TISSUE, UNSPECIFIED: Chronic | Status: ACTIVE | Noted: 2025-02-21

## 2025-02-25 PROBLEM — D57.3 SICKLE-CELL TRAIT: Chronic | Status: ACTIVE | Noted: 2025-02-21

## 2025-02-25 PROCEDURE — 76816 OB US FOLLOW-UP PER FETUS: CPT

## 2025-03-12 ENCOUNTER — ASOB RESULT (OUTPATIENT)
Age: 35
End: 2025-03-12

## 2025-03-12 ENCOUNTER — OUTPATIENT (OUTPATIENT)
Dept: INPATIENT UNIT | Facility: HOSPITAL | Age: 35
LOS: 1 days | Discharge: HOME | End: 2025-03-12
Payer: COMMERCIAL

## 2025-03-12 ENCOUNTER — APPOINTMENT (OUTPATIENT)
Dept: ANTEPARTUM | Facility: CLINIC | Age: 35
End: 2025-03-12
Payer: COMMERCIAL

## 2025-03-12 ENCOUNTER — EMERGENCY (EMERGENCY)
Facility: HOSPITAL | Age: 35
LOS: 1 days | Discharge: STILL ADMITTED OR ADMINISTRATIVE | End: 2025-03-12
Attending: STUDENT IN AN ORGANIZED HEALTH CARE EDUCATION/TRAINING PROGRAM | Admitting: STUDENT IN AN ORGANIZED HEALTH CARE EDUCATION/TRAINING PROGRAM
Payer: COMMERCIAL

## 2025-03-12 VITALS
HEART RATE: 80 BPM | DIASTOLIC BLOOD PRESSURE: 60 MMHG | RESPIRATION RATE: 17 BRPM | TEMPERATURE: 98 F | SYSTOLIC BLOOD PRESSURE: 109 MMHG

## 2025-03-12 VITALS
RESPIRATION RATE: 16 BRPM | HEART RATE: 81 BPM | SYSTOLIC BLOOD PRESSURE: 106 MMHG | DIASTOLIC BLOOD PRESSURE: 69 MMHG | TEMPERATURE: 98 F | OXYGEN SATURATION: 100 %

## 2025-03-12 VITALS
HEIGHT: 68 IN | DIASTOLIC BLOOD PRESSURE: 74 MMHG | RESPIRATION RATE: 16 BRPM | WEIGHT: 164.02 LBS | TEMPERATURE: 99 F | OXYGEN SATURATION: 97 % | SYSTOLIC BLOOD PRESSURE: 112 MMHG | HEART RATE: 87 BPM

## 2025-03-12 VITALS — DIASTOLIC BLOOD PRESSURE: 53 MMHG | SYSTOLIC BLOOD PRESSURE: 103 MMHG | HEART RATE: 64 BPM

## 2025-03-12 DIAGNOSIS — L98.9 DISORDER OF THE SKIN AND SUBCUTANEOUS TISSUE, UNSPECIFIED: Chronic | ICD-10-CM

## 2025-03-12 DIAGNOSIS — O26.899 OTHER SPECIFIED PREGNANCY RELATED CONDITIONS, UNSPECIFIED TRIMESTER: ICD-10-CM

## 2025-03-12 LAB
ADD ON TEST-SPECIMEN IN LAB: SIGNIFICANT CHANGE UP
ADD ON TEST-SPECIMEN IN LAB: SIGNIFICANT CHANGE UP
ALBUMIN SERPL ELPH-MCNC: 3.4 G/DL — SIGNIFICANT CHANGE UP (ref 3.3–5)
ALP SERPL-CCNC: 87 U/L — SIGNIFICANT CHANGE UP (ref 40–120)
ALT FLD-CCNC: 17 U/L — SIGNIFICANT CHANGE UP (ref 4–33)
ANION GAP SERPL CALC-SCNC: 14 MMOL/L — SIGNIFICANT CHANGE UP (ref 7–14)
APPEARANCE UR: ABNORMAL
APTT BLD: 26.3 SEC — SIGNIFICANT CHANGE UP (ref 24.5–35.6)
AST SERPL-CCNC: 20 U/L — SIGNIFICANT CHANGE UP (ref 4–32)
BACTERIA # UR AUTO: NEGATIVE /HPF — SIGNIFICANT CHANGE UP
BASOPHILS # BLD AUTO: 0.03 K/UL — SIGNIFICANT CHANGE UP (ref 0–0.2)
BASOPHILS NFR BLD AUTO: 0.5 % — SIGNIFICANT CHANGE UP (ref 0–2)
BILIRUB SERPL-MCNC: 0.6 MG/DL — SIGNIFICANT CHANGE UP (ref 0.2–1.2)
BILIRUB UR-MCNC: NEGATIVE — SIGNIFICANT CHANGE UP
BLD GP AB SCN SERPL QL: NEGATIVE — SIGNIFICANT CHANGE UP
BLOOD GAS VENOUS COMPREHENSIVE RESULT: SIGNIFICANT CHANGE UP
BUN SERPL-MCNC: 6 MG/DL — LOW (ref 7–23)
CALCIUM SERPL-MCNC: 9.5 MG/DL — SIGNIFICANT CHANGE UP (ref 8.4–10.5)
CAST: 0 /LPF — SIGNIFICANT CHANGE UP (ref 0–4)
CHLORIDE SERPL-SCNC: 102 MMOL/L — SIGNIFICANT CHANGE UP (ref 98–107)
CO2 SERPL-SCNC: 19 MMOL/L — LOW (ref 22–31)
COLOR SPEC: YELLOW — SIGNIFICANT CHANGE UP
CREAT ?TM UR-MCNC: 64 MG/DL — SIGNIFICANT CHANGE UP
CREAT SERPL-MCNC: 0.55 MG/DL — SIGNIFICANT CHANGE UP (ref 0.5–1.3)
DIFF PNL FLD: NEGATIVE — SIGNIFICANT CHANGE UP
EGFR: 124 ML/MIN/1.73M2 — SIGNIFICANT CHANGE UP
EOSINOPHIL # BLD AUTO: 0.16 K/UL — SIGNIFICANT CHANGE UP (ref 0–0.5)
EOSINOPHIL NFR BLD AUTO: 2.4 % — SIGNIFICANT CHANGE UP (ref 0–6)
FLUAV AG NPH QL: SIGNIFICANT CHANGE UP
FLUBV AG NPH QL: SIGNIFICANT CHANGE UP
GLUCOSE SERPL-MCNC: 69 MG/DL — LOW (ref 70–99)
GLUCOSE UR QL: 500 MG/DL
HCT VFR BLD CALC: 31.7 % — LOW (ref 34.5–45)
HGB BLD-MCNC: 10.4 G/DL — LOW (ref 11.5–15.5)
IANC: 4.44 K/UL — SIGNIFICANT CHANGE UP (ref 1.8–7.4)
IMM GRANULOCYTES NFR BLD AUTO: 0.6 % — SIGNIFICANT CHANGE UP (ref 0–0.9)
INR BLD: 1.06 RATIO — SIGNIFICANT CHANGE UP (ref 0.85–1.16)
KETONES UR-MCNC: NEGATIVE MG/DL — SIGNIFICANT CHANGE UP
LDH SERPL L TO P-CCNC: 161 U/L — SIGNIFICANT CHANGE UP (ref 135–225)
LEUKOCYTE ESTERASE UR-ACNC: NEGATIVE — SIGNIFICANT CHANGE UP
LYMPHOCYTES # BLD AUTO: 1.29 K/UL — SIGNIFICANT CHANGE UP (ref 1–3.3)
LYMPHOCYTES # BLD AUTO: 19.5 % — SIGNIFICANT CHANGE UP (ref 13–44)
MCHC RBC-ENTMCNC: 25.4 PG — LOW (ref 27–34)
MCHC RBC-ENTMCNC: 32.8 G/DL — SIGNIFICANT CHANGE UP (ref 32–36)
MCV RBC AUTO: 77.5 FL — LOW (ref 80–100)
MONOCYTES # BLD AUTO: 0.64 K/UL — SIGNIFICANT CHANGE UP (ref 0–0.9)
MONOCYTES NFR BLD AUTO: 9.7 % — SIGNIFICANT CHANGE UP (ref 2–14)
NEUTROPHILS # BLD AUTO: 4.44 K/UL — SIGNIFICANT CHANGE UP (ref 1.8–7.4)
NEUTROPHILS NFR BLD AUTO: 67.3 % — SIGNIFICANT CHANGE UP (ref 43–77)
NITRITE UR-MCNC: NEGATIVE — SIGNIFICANT CHANGE UP
NRBC # BLD AUTO: 0 K/UL — SIGNIFICANT CHANGE UP (ref 0–0)
NRBC # FLD: 0 K/UL — SIGNIFICANT CHANGE UP (ref 0–0)
NRBC BLD AUTO-RTO: 0 /100 WBCS — SIGNIFICANT CHANGE UP (ref 0–0)
PH UR: 6.5 — SIGNIFICANT CHANGE UP (ref 5–8)
PLATELET # BLD AUTO: 421 K/UL — HIGH (ref 150–400)
POTASSIUM SERPL-MCNC: 4 MMOL/L — SIGNIFICANT CHANGE UP (ref 3.5–5.3)
POTASSIUM SERPL-SCNC: 4 MMOL/L — SIGNIFICANT CHANGE UP (ref 3.5–5.3)
PROT ?TM UR-MCNC: 8 MG/DL — SIGNIFICANT CHANGE UP
PROT SERPL-MCNC: 6.7 G/DL — SIGNIFICANT CHANGE UP (ref 6–8.3)
PROT UR-MCNC: NEGATIVE MG/DL — SIGNIFICANT CHANGE UP
PROT/CREAT UR-RTO: 0.1 RATIO — SIGNIFICANT CHANGE UP (ref 0–0.2)
PROTHROM AB SERPL-ACNC: 12.3 SEC — SIGNIFICANT CHANGE UP (ref 9.9–13.4)
RBC # BLD: 4.09 M/UL — SIGNIFICANT CHANGE UP (ref 3.8–5.2)
RBC # FLD: 18.3 % — HIGH (ref 10.3–14.5)
RBC CASTS # UR COMP ASSIST: 1 /HPF — SIGNIFICANT CHANGE UP (ref 0–4)
REVIEW: SIGNIFICANT CHANGE UP
RH IG SCN BLD-IMP: POSITIVE — SIGNIFICANT CHANGE UP
RSV RNA NPH QL NAA+NON-PROBE: SIGNIFICANT CHANGE UP
SARS-COV-2 RNA SPEC QL NAA+PROBE: SIGNIFICANT CHANGE UP
SODIUM SERPL-SCNC: 135 MMOL/L — SIGNIFICANT CHANGE UP (ref 135–145)
SP GR SPEC: 1.01 — SIGNIFICANT CHANGE UP (ref 1–1.03)
SQUAMOUS # UR AUTO: 2 /HPF — SIGNIFICANT CHANGE UP (ref 0–5)
URATE SERPL-MCNC: 4 MG/DL — SIGNIFICANT CHANGE UP (ref 2.5–7)
UROBILINOGEN FLD QL: 0.2 MG/DL — SIGNIFICANT CHANGE UP (ref 0.2–1)
WBC # BLD: 6.6 K/UL — SIGNIFICANT CHANGE UP (ref 3.8–10.5)
WBC # FLD AUTO: 6.6 K/UL — SIGNIFICANT CHANGE UP (ref 3.8–10.5)
WBC UR QL: 0 /HPF — SIGNIFICANT CHANGE UP (ref 0–5)

## 2025-03-12 PROCEDURE — 71045 X-RAY EXAM CHEST 1 VIEW: CPT | Mod: 26

## 2025-03-12 PROCEDURE — 76815 OB US LIMITED FETUS(S): CPT | Mod: 26

## 2025-03-12 PROCEDURE — 76705 ECHO EXAM OF ABDOMEN: CPT | Mod: 26

## 2025-03-12 PROCEDURE — 99213 OFFICE O/P EST LOW 20 MIN: CPT

## 2025-03-12 PROCEDURE — 99285 EMERGENCY DEPT VISIT HI MDM: CPT

## 2025-03-12 PROCEDURE — 99284 EMERGENCY DEPT VISIT MOD MDM: CPT | Mod: GC

## 2025-03-12 PROCEDURE — 76817 TRANSVAGINAL US OBSTETRIC: CPT | Mod: 26

## 2025-03-12 PROCEDURE — 76819 FETAL BIOPHYS PROFIL W/O NST: CPT | Mod: 26

## 2025-03-12 RX ORDER — PRENATAL 136/IRON/FOLIC ACID 27 MG-1 MG
1 TABLET ORAL DAILY
Refills: 0 | Status: ACTIVE | OUTPATIENT
Start: 2025-03-12 | End: 2026-02-08

## 2025-03-12 RX ORDER — DEXTROSE 50 % IN WATER 50 %
50 SYRINGE (ML) INTRAVENOUS ONCE
Refills: 0 | Status: COMPLETED | OUTPATIENT
Start: 2025-03-12 | End: 2025-03-12

## 2025-03-12 RX ORDER — SODIUM CHLORIDE 9 G/1000ML
1000 INJECTION, SOLUTION INTRAVENOUS
Refills: 0 | Status: ACTIVE | OUTPATIENT
Start: 2025-03-12 | End: 2026-02-08

## 2025-03-12 RX ORDER — ALBUTEROL SULFATE 2.5 MG/3ML
2 VIAL, NEBULIZER (ML) INHALATION EVERY 6 HOURS
Refills: 0 | Status: ACTIVE | OUTPATIENT
Start: 2025-03-12 | End: 2026-02-08

## 2025-03-12 RX ADMIN — Medication 50 MILLILITER(S): at 13:35

## 2025-03-12 RX ADMIN — Medication 50 MILLILITER(S): at 12:31

## 2025-03-12 RX ADMIN — Medication 40 MILLIGRAM(S): at 11:36

## 2025-03-12 NOTE — ED ADULT NURSE NOTE - NSFALLUNIVINTERV_ED_ALL_ED
Bed/Stretcher in lowest position, wheels locked, appropriate side rails in place/Call bell, personal items and telephone in reach/Instruct patient to call for assistance before getting out of bed/chair/stretcher/Non-slip footwear applied when patient is off stretcher/[place] to call system/Physically safe environment - no spills, clutter or unnecessary equipment/Purposeful proactive rounding/Room/bathroom lighting operational, light cord in reach

## 2025-03-12 NOTE — CHART NOTE - NSCHARTNOTEFT_GEN_A_CORE
35 yo , EGA@30 weeks was brought to ED via ambulance due to "vomiting blood".  Patient was seen at bedside in ED room#18, OB huddle with Dr Harrison.  Patient reports having cold symptoms with coughing within last week, was prescribed Robitussin and Prednisone 20 mg for 5 days.  Patient stated starting to vomit this morning, about 100 cc of bright red blood X 1episode. Denies fever, chills, headache; reports right sided abdominal pain (at the site of uterine myoma), persistent since last night.  Prenatal care with Dr Sanchez, at [facility].   Prenatal course is significant for anemia, s/p Fe infusions.  Patient was evaluated at [facility] D&T on 2025 due to URI symptoms, was found to be anemic with H/H 25.9.    Med hx: asthma, denies hx of intubation, uses Albuterol prn  Surg hx: facial cyst removed at age 8  GYN hx: uterine myomas: right lateral measuring 8.5 cm x5.8cm  x 6 cm  Allergy: NKDA  Meds: PNV, Albuterol    Upon evaluation, patient appears in mild distress, no hemoptyses at this time  T(C): 37.1 (25 @ 10:50), Max: 37.1 (25 @ 10:50)  HR: 87 (25 @ 10:50) (87 - 87)  BP: 112/74 (25 @ 10:50) (112/74 - 112/74)  RR: 16 (25 @ 10:50) (16 - 16)  SpO2: 97% (25 @ 10:50) (97% - 97%)Height (cm): 172.7 (25 @ 10:50)  Weight (kg): 74.4 (25 @ 10:50)  BMI (kg/m2): 24.9 (25 @ 10:50)  BSA (m2): 1.88 (25 @ 10:50)    A&Ox3  Heart: RRR, S1&S2, no S3  Lungs: Clear bilateral to auscultation, good inspiratory /expiratory effort              no rhonchi, no rales  Abd: soft, Gravid, TOCO in place     Ext:  FROM /bilateral  1+ LE edema   Neuro: grossly intact                        10.4   6.60  )-----------( 421      ( 12 Mar 2025 11:34 )             31.7       NST is in progress  ED providers at bedside evaluating  A: 35 yo , EGA@30 weeks with an episode of hematemesis  P: as per Dr Harrison: ED evaluation for hematemesis, HELLP labs and transfer to OB D&T if any OB complaint.    Heide Diaz CNM

## 2025-03-12 NOTE — ED PROVIDER NOTE - PHYSICAL EXAMINATION
Vital signs reviewed.   CONSTITUTIONAL: Well-appearing; well-nourished; in no apparent distress. Non-toxic appearing.   HEAD: Normocephalic, atraumatic.  EYES: PERRL, EOM intact, conjunctiva and sclera WNL.  CARD: Normal S1, S2; no murmurs, rubs, or gallops noted.  RESP: Normal chest excursion with respiration; breath sounds clear and equal bilaterally; no wheezes, rhonchi, or rales.  ABD/GI: soft, gravid, RUQ TTP   EXT/MS: moves all extremities; distal pulses are normal, no pedal edema.  SKIN: Normal for age and race; warm; dry; good turgor; no apparent lesions or exudate noted.  NEURO: Awake, alert, oriented x 3, no gross deficits, CN II-XII grossly intact, no motor or sensory deficit noted.  PSYCH: Normal mood; appropriate affect.

## 2025-03-12 NOTE — OB PROVIDER TRIAGE NOTE - HISTORY OF PRESENT ILLNESS
33yo  @ 30.0 presents for OB clearance after ER visit. Patient was seen and evaluated in the ED for c/o vomiting blood. Patient had NST in ED and was found to be dee on NST.   At this time, patient denies any complaints and reports GFM.   Gets care with provider from [facility].     H/O Anemia- has had Fe Infusion x 3  Asthma- last attack years ago  SC trait

## 2025-03-12 NOTE — ED PROVIDER NOTE - OBJECTIVE STATEMENT
34-year-old female with past medical history of fibroids, anemia, sickle cell trait, asthma who is currently  at 30 weeks gestation who presents to the ER with complaint of hematemesis approximately 20 cc x 1 episode.  Patient explains that she is having right-sided abdominal pain, that began to worsen today however notes has been present during pregnancy.  Patient explains that last week she was having symptoms of an upper respiratory infection was given prednisone for 5 days with improvement of symptoms.  Patient was found to be anemic with hemoglobin 8.5 was given iron infusion yesterday.  Denies chest pain, shortness of breath, headache, diarrhea, urinary symptoms, vaginal bleeding, vaginal discharge, leakage of fluid.

## 2025-03-12 NOTE — ED PROVIDER NOTE - ATTENDING APP SHARED VISIT CONTRIBUTION OF CARE
I, Pawel Hamilton, DO reviewed the FEI plan of care and discussed the case with the ACP.  I was available for any questions and concerns    HPI is reviewed  of not pt reports she has been having epigastric discomfort and heart burn throughout the pregnancy    Well appearing. No distress. CTAB. Abd gravid. ttp in epigastric region and ruq.     Ddx include but not limited to electrolyte abnormality, anemia, gastric ulcer - ?bleeding, gall bladder pathology.  PE is on differential - pregnancy is a risk factor. Pt does not have cp/sb. HR and spo2 within normal range - will obtain d-dimer (which likely will be elevated in preg. will use years criteria)

## 2025-03-12 NOTE — OB PROVIDER TRIAGE NOTE - NS_VAGBLEEDING_OBGYN_ALL_OB
Pt states that she is going through some changes at home, and is not doing well. And wants to know what should she do? I tried to connect pt with RN, but then pt hung up. Endo RN was informed and she will give the pt a call back. None

## 2025-03-12 NOTE — ED ADULT NURSE NOTE - OBJECTIVE STATEMENT
pt is a 34y old female received awake and responsive, c/o of diffuse rt upper quad pain  and vomited blood  last night , pt denies any vag bleed, cardiac monitor is on, vss, pt denies any chest pain,  huddle in progress, labs drawn and sent as per MD order

## 2025-03-12 NOTE — CONSULT NOTE ADULT - ASSESSMENT
33 yo F  30 wk gestation with PMH of fibroids, sickle cell tract, anemia, and asthma presenting for n/v and one episode of hemoptysis, found to have Hgb of 10 which is baseline.      Impression:  #Hemoptysis  #N/V    P/w cough and nausea symptoms for several days. Pt had multiple episodes of NBNB emesis then had one episode of spitting up clear sputum mixed with blood (~1 tablespoon). She denies any hematemesis. Pt endorses recent illness symptoms including cough and n/v. Suspect small volume hemoptysis likely due to upper airway irritation in setting of illness. Hgb stable at 10.5 on admission which is improved from baseline with recent outpatient labs showing Hgb 9.5 3/11/25 and Hgb of 9 on 25. Pt endorses reflux during pregnancy which is well controlled. Low suspicion for GI bleed at this time. Given low suspicion and risk to fetus with procedure, would favor conservative management     - Hold off on endoscopic evaluation given low suspicion for GI bleed and risk to fetus unless further overt signs of bleeding  - OBGYN consult   - Trend cbc, active T&S, transfuse Hgb>7  - Start oral PPI BID (if safe from obgyn standpoint)    All recommendations are tentative until note is attested by attending.     Isabel Davis, PGY4  Gastroenterology/Hepatology Fellow  Available on Microsoft Teams  [phone] (Long Range Pager)  [phone] (Short Range Pager [facility])    After 5 pm, please contact the on-call GI fellow for any urgent issues via the Hospital Call   35 yo F  30 wk gestation with PMH of fibroids, sickle cell tract, anemia, and asthma presenting for n/v and one episode of hemoptysis, found to have Hgb of 10 which is baseline.      Impression:  #Hemoptysis  #N/V    P/w cough and nausea symptoms for several days. Pt had multiple episodes of NBNB emesis then had one episode of spitting up clear sputum mixed with blood (~1 tablespoon). She denies any hematemesis. Pt endorses recent illness symptoms including cough and n/v. Suspect small volume hemoptysis likely due to upper airway irritation in setting of illness. Hgb stable at 10.5 on admission which is improved from baseline with recent outpatient labs showing Hgb 9.5 3/11/25 and Hgb of 9 on 25. Pt endorses reflux during pregnancy which is well controlled. Low suspicion for GI bleed at this time. Given low suspicion and risk to fetus with procedure, would favor conservative management     - Hold off on endoscopic evaluation given low suspicion for GI bleed and risk to fetus unless further overt signs of bleeding  - OBGYN consult   - Trend cbc, active T&S, transfuse Hgb>7  - Start oral PPI BID (if safe from obgyn standpoint)  - Avoid nsaid medications    All recommendations are tentative until note is attested by attending.     Isabel Davis, PGY4  Gastroenterology/Hepatology Fellow  Available on Microsoft Teams  [phone] (Long Range Pager)  [phone] (Short Range Pager [facility])    After 5 pm, please contact the on-call GI fellow for any urgent issues via the Hospital Call

## 2025-03-12 NOTE — ED ADULT TRIAGE NOTE - PATIENT ON (OXYGEN DELIVERY METHOD)
Spoke with patient's mother, Ayana- NPT appointment scheduled with Dr. Dodson on 10/11/19.  Scheduled appt as a 40 minute but unsure if this is needed due to patient's age- would you like me to shorten appt to 20 min duration?    Orders placed on Dr. Dodson's desk  To Dr. Dodson    room air

## 2025-03-12 NOTE — H&P ADULT - ASSESSMENT
A: 33 yo , EGA@30 weeks, uterien myomas, with hemoptysis   Plan: as per Dr. Harrison  - NST  - HEL labs, T&S  - if OB complaints, plan for evaluation in D&T after ED evaluation for hemoptysis      Heide Diaz CNM

## 2025-03-12 NOTE — ED ADULT NURSE REASSESSMENT NOTE - NS ED NURSE REASSESS COMMENT FT1
Break RN: Spoke to L&D triage, provider spoke to denys Spencer to be transferred to L&D for further monitor. Pt has not further complaints at this time.

## 2025-03-12 NOTE — ED PROVIDER NOTE - CLINICAL SUMMARY MEDICAL DECISION MAKING FREE TEXT BOX
34-year-old female with past medical history of fibroids, anemia, sickle cell trait, asthma who is currently  at 30 weeks gestation who presents to the ER with complaint of hematemesis approximately 20 cc x 1 episode.  Patient explains that she is having right-sided abdominal pain, that began to worsen today however notes has been present during pregnancy.  Patient explains that last week she was having symptoms of an upper respiratory infection was given prednisone for 5 days with improvement of symptoms.  Patient was found to be anemic with hemoglobin 8.5 was given iron infusion yesterday.   Patient was called as a  huddle in triage.  Will be down at bedside NST performed patient currently still on monitor noted some infrequent contractions.  Patient tender in the right upper quadrant plan for right upper quadrant ultrasound pending labs, urine, chest x-ray GI was consulted for hematemesis pending evaluation and recommendations.

## 2025-03-12 NOTE — CONSULT NOTE ADULT - SUBJECTIVE AND OBJECTIVE BOX
Chief Complaint: nausea, hemoptysis    HPI:  Alba Peace is a 33 yo F  30 wk gestation with PMH of fibroids, sickle cell tract, anemia, and asthma presenting for one episode of hemoptysis. She endorses ongoingcough and nausea for the past few days. She had 1 episode of NBNB emesis last night and another episode today. She then had episode of spitting up sputum with about a teaspoon of blood. She reports cough and dry hacking for several days and also had on and off nose bleeds for the past few days. She denies any hematemesis, melena, or hematochezia. She denies any history of gi bleeds in the past and has never had an endoscopy or colonoscopy. She has had reflux throughout her pregnancy but has not needed any medications for the reflux.     Allergies:  latex (Hives)  No Known Drug Allergies      Home Medications:    Hospital Medications:  albuterol    90 MICROgram(s) HFA Inhaler 2 Puff(s) Inhalation every 6 hours PRN  dextrose 5% + sodium chloride 0.9%. 1000 milliLiter(s) IV Continuous <Continuous>  prenatal multivitamin 1 Tablet(s) Oral daily      PMHX/PSHX:  Asthma    Fibroids    Anemia    Sickle cell trait    No significant past surgical history    Benign skin lesion of face        Family history:      Denies family history of colon cancer/polyps, stomach cancer/polyps, pancreatic cancer/masses, liver cancer/disease, ovarian cancer and endometrial cancer.    Social History:     Tob: Denies  EtOH: As above  Illicit Drugs: Denies    ROS:   General:  No wt loss, fevers, chills, night sweats, fatigue  Eyes:  Good vision, no reported pain  ENT:  No sore throat, pain, runny nose, dysphagia  CV:  No pain, palpitations, hypo/hypertension  Pulm:  No dyspnea, cough, tachypnea, wheezing  GI:  As per HPI  :  No pain, bleeding, incontinence, nocturia  Muscle:  No pain, weakness  Neuro:  No weakness, tingling, memory problems  Psych:  No fatigue, insomnia, mood problems, depression  Endocrine:  No polyuria, polydipsia, cold/heat intolerance  Heme:  No petechiae, ecchymosis, easy bruisability  Skin:  No rash, tattoos, scars, edema    PHYSICAL EXAM:   GENERAL:  No acute distress  HEENT:  Normocephalic/atraumatic, no scleral icterus  CHEST:  No accessory muscle use  HEART:  Regular rate and rhythm  ABDOMEN:  Soft, non-tender, non-distended, normoactive bowel sounds,  no masses, no hepato-splenomegaly, no signs of chronic liver disease  EXTREMITIES: No cyanosis, clubbing, or edema  SKIN:  No rash  NEURO:  Alert and oriented x 3, no asterixis    Vital Signs:  Vital Signs Last 24 Hrs  T(C): 36.9 (12 Mar 2025 13:45), Max: 37.1 (12 Mar 2025 10:50)  T(F): 98.5 (12 Mar 2025 13:45), Max: 98.7 (12 Mar 2025 10:50)  HR: 81 (12 Mar 2025 13:45) (81 - 87)  BP: 106/69 (12 Mar 2025 13:45) (106/69 - 112/74)  BP(mean): --  RR: 16 (12 Mar 2025 13:45) (16 - 16)  SpO2: 100% (12 Mar 2025 13:45) (97% - 100%)    Parameters below as of 12 Mar 2025 13:45  Patient On (Oxygen Delivery Method): room air      Daily Height in cm: 172.72 (12 Mar 2025 10:50)    Daily     LABS:                        10.4   6.60  )-----------( 421      ( 12 Mar 2025 11:34 )             31.7     Mean Cell Volume: 77.5 fL (25 @ 11:34)    03    135  |  102  |  6[L]  ----------------------------<  69[L]  4.0   |  19[L]  |  0.55    Ca    9.5      12 Mar 2025 11:34    TPro  6.7  /  Alb  3.4  /  TBili  0.6  /  DBili  x   /  AST  20  /  ALT  17  /  AlkPhos  87  12    LIVER FUNCTIONS - ( 12 Mar 2025 11:34 )  Alb: 3.4 g/dL / Pro: 6.7 g/dL / ALK PHOS: 87 U/L / ALT: 17 U/L / AST: 20 U/L / GGT: x           PT/INR - ( 12 Mar 2025 11:34 )   PT: 12.3 sec;   INR: 1.06 ratio         PTT - ( 12 Mar 2025 11:34 )  PTT:26.3 sec  Urinalysis Basic - ( 12 Mar 2025 11:34 )    Color: x / Appearance: x / SG: x / pH: x  Gluc: 69 mg/dL / Ketone: x  / Bili: x / Urobili: x   Blood: x / Protein: x / Nitrite: x   Leuk Esterase: x / RBC: x / WBC x   Sq Epi: x / Non Sq Epi: x / Bacteria: x      Amylase Serum--      Lipase serum14       Ammonia--                          10.4   6.60  )-----------( 421      ( 12 Mar 2025 11:34 )             31.7       Imaging:           Chief Complaint: nausea, hemoptysis    HPI:  Alba Peace is a 33 yo F  30 wk gestation with PMH of fibroids, sickle cell tract, anemia, and asthma presenting for one episode of hemoptysis. She endorses ongoing cough and nausea for the past few days. She had 1 episode of NBNB emesis last night and another episode today. She then had episode of spitting up sputum with about a tablespoon of blood. She reports cough and dry hacking for several days and also had on and off nose bleeds for the past few days. She denies any hematemesis, melena, or hematochezia. She denies any history of gi bleeds in the past and has never had an endoscopy or colonoscopy. She has had reflux throughout her pregnancy but has not needed any medications for the reflux.     On admission, T 112/74, HR 87, RR 16, SpO2 97%. Labs notable for Hgb 10.t, wbc 6.6, plt 421, INR 1, Na 135, Cr 0.55, bicarb 19, glucose 69, nl liver tests. UA and CXR negative. Recent outpatient labs show Hgb 9.5 3/11/25 and Hgb of 9 on 25.     Allergies:  latex (Hives)  No Known Drug Allergies      Home Medications:    Hospital Medications:  albuterol    90 MICROgram(s) HFA Inhaler 2 Puff(s) Inhalation every 6 hours PRN  dextrose 5% + sodium chloride 0.9%. 1000 milliLiter(s) IV Continuous <Continuous>  prenatal multivitamin 1 Tablet(s) Oral daily      PMHX/PSHX:  Asthma    Fibroids    Anemia    Sickle cell trait    No significant past surgical history    Benign skin lesion of face        Family history:      Denies family history of colon cancer/polyps, stomach cancer/polyps, pancreatic cancer/masses, liver cancer/disease, ovarian cancer and endometrial cancer.    Social History:     Tob: Denies  EtOH: As above  Illicit Drugs: Denies    ROS:   General:  No wt loss, fevers, chills, night sweats, fatigue  Eyes:  Good vision, no reported pain  ENT:  No sore throat, pain, runny nose, dysphagia  CV:  No pain, palpitations, hypo/hypertension  Pulm:  No dyspnea, cough, tachypnea, wheezing  GI:  As per HPI  :  No pain, bleeding, incontinence, nocturia  Muscle:  No pain, weakness  Neuro:  No weakness, tingling, memory problems  Psych:  No fatigue, insomnia, mood problems, depression  Endocrine:  No polyuria, polydipsia, cold/heat intolerance  Heme:  No petechiae, ecchymosis, easy bruisability  Skin:  No rash, tattoos, scars, edema    PHYSICAL EXAM:   GENERAL:  No acute distress  HEENT:  Normocephalic/atraumatic, no scleral icterus  CHEST:  No accessory muscle use  HEART:  Regular rate and rhythm  ABDOMEN:  Soft, gravid abdomen, non-tender, non-distended, normoactive bowel sounds,  no masses, no hepato-splenomegaly, no signs of chronic liver disease  EXTREMITIES: No cyanosis, clubbing, or edema  SKIN:  No rash  NEURO:  Alert and oriented x 3, no asterixis    Vital Signs:  Vital Signs Last 24 Hrs  T(C): 36.9 (12 Mar 2025 13:45), Max: 37.1 (12 Mar 2025 10:50)  T(F): 98.5 (12 Mar 2025 13:45), Max: 98.7 (12 Mar 2025 10:50)  HR: 81 (12 Mar 2025 13:45) (81 - 87)  BP: 106/69 (12 Mar 2025 13:45) (106/69 - 112/74)  BP(mean): --  RR: 16 (12 Mar 2025 13:45) (16 - 16)  SpO2: 100% (12 Mar 2025 13:45) (97% - 100%)    Parameters below as of 12 Mar 2025 13:45  Patient On (Oxygen Delivery Method): room air      Daily Height in cm: 172.72 (12 Mar 2025 10:50)    Daily     LABS:                        10.4   6.60  )-----------( 421      ( 12 Mar 2025 11:34 )             31.7     Mean Cell Volume: 77.5 fL (03-25 @ 11:34)        135  |  102  |  6[L]  ----------------------------<  69[L]  4.0   |  19[L]  |  0.55    Ca    9.5      12 Mar 2025 11:34    TPro  6.7  /  Alb  3.4  /  TBili  0.6  /  DBili  x   /  AST  20  /  ALT  17  /  AlkPhos  87  03-12    LIVER FUNCTIONS - ( 12 Mar 2025 11:34 )  Alb: 3.4 g/dL / Pro: 6.7 g/dL / ALK PHOS: 87 U/L / ALT: 17 U/L / AST: 20 U/L / GGT: x           PT/INR - ( 12 Mar 2025 11:34 )   PT: 12.3 sec;   INR: 1.06 ratio         PTT - ( 12 Mar 2025 11:34 )  PTT:26.3 sec  Urinalysis Basic - ( 12 Mar 2025 11:34 )    Color: x / Appearance: x / SG: x / pH: x  Gluc: 69 mg/dL / Ketone: x  / Bili: x / Urobili: x   Blood: x / Protein: x / Nitrite: x   Leuk Esterase: x / RBC: x / WBC x   Sq Epi: x / Non Sq Epi: x / Bacteria: x      Amylase Serum--      Lipase serum14       Ammonia--                          10.4   6.60  )-----------( 421      ( 12 Mar 2025 11:34 )             31.7       Imaging:

## 2025-03-12 NOTE — OB PROVIDER TRIAGE NOTE - NSOBPROVIDERNOTE_OBGYN_ALL_OB_FT
Plan D/W Dr. Christine, no evidence of acute process at this time.  Normal fetal testing  Follow up as scheduled

## 2025-03-12 NOTE — H&P ADULT - HISTORY OF PRESENT ILLNESS
35 yo , EGA@30 weeks was brought to ED by EMS due to vomiting blood.  Patient reports vomiting this morning of about 100 cc blood. Patient stated she had cold symptoms within last week, was treated with Robitussin and Prednisone 20 mg for 5 days.  Currently denies fever, chills, cough.  Patient reports pain at right abdomen (at the fibroid side), denies contractions, leaking fluid, vaginal bleeding, reports positive fetal movements.  Prenatal care with Dr. Sanchez, at [facility],  Prenatal course is significant for:  - pain related to the uterine myoma  - EFW 1232 g from the last MFM scan on 2025

## 2025-03-12 NOTE — ED PROVIDER NOTE - PROGRESS NOTE DETAILS
pt just vomited in the ed - no blood. Defers antiemetics. Last ate/drank 11pm last night that is likely the cause of her hypoglycemia.   Pt denies coughing, cp, sob. D-dimer in DDU units is 700. PE is excluded using years criteria. SHe does not have sings of dvt, does not have hemoptysis, pe is less likely given the signs/sxs/vitals.   Gi is already consutled  Hersnalini Hamilton, DO

## 2025-03-12 NOTE — H&P ADULT - NSHPPHYSICALEXAM_GEN_ALL_CORE
T(C): 37.1 (03-12-25 @ 10:50), Max: 37.1 (03-12-25 @ 10:50)  HR: 87 (03-12-25 @ 10:50) (87 - 87)  BP: 112/74 (03-12-25 @ 10:50) (112/74 - 112/74)  RR: 16 (03-12-25 @ 10:50) (16 - 16)  SpO2: 97% (03-12-25 @ 10:50) (97% - 97%)Height (cm): 172.7 (03-12-25 @ 10:50)  Weight (kg): 74.4 (03-12-25 @ 10:50)  BMI (kg/m2): 24.9 (03-12-25 @ 10:50)  BSA (m2): 1.88 (03-12-25 @ 10:50)    A&Ox3  Heart: RRR, S1&S2, no S3  Lungs: Clear bilateral to auscultation, good inspiratory /expiratory effort              no rhonchi, no rales  Abd: soft, Gravid, TOCO in place       Ext:  FROM /bilateral  1+ LE edema   Neuro: grossly intact

## 2025-03-12 NOTE — ED PROVIDER NOTE - DISPOSITION AOU
*Medical Student Note- DO NOT USE FOR BILLING*  [facility] Internal Medicine   Progress Note    Patient: Kareen Moran Date: 7/12/2022   YOB: 1979 Admission Date: 7/7/2022   MRN: [id number] Attending: Beni Hernandez MD     SUMMARY  43 year old female with PMH of alcoholic liver cirrohosis (TIPS on 10/9/2020 and revision on 3/30/2022), alcohol abuse, HLD, HTN, DM2, and depression who presented on 7/7 over concerns of \"abnormal liver enzymes\" drawn on 7/5. Maddrey score was 39.36. Endorses some nausea (no vomiting), abdominal pain, and reduced appetite over the last few days. However symptoms were not severe and she came to ED at direction of Nini Mercado PA-C after concern of the 7/5 LFTs. LFTs while concerning have stayed consistent over the last 3 months. Consulted hepatology for next steps, including blood and urine cultures and evaluation for steroids given elevated Maddrey score. Blood culture was negative and urine culture on 7/7 showed E. Coli and gram positive andrey. Patient was started on ceftriaxone on 7/9, and plan to start prednisolone as per hepatology.     Has recurrent diarrhea ~4x/day. Denies any red blood or incontinence in stool, but did note a black color in stool once.    Patient has had recurrent instances of epistaxis ranging from 5-20 minutes and varying in intensity. Upon administration of IV Vit K, patient vomited/was short of breath and it was discontinued.     Patient noted painful sores on buccal mucosa on 7/10. Upon examination, 1-2 erythematous macules were noted on each side.     Tramadol added for headaches.     Patient endorses acid reflux for the past few months, and has gotten worse in the last few weeks. Noted acute exacerbations at night during hospital stay. Endorses periodic epigastric pain that is not associated with meals or bowel movements.     SUBJECTIVE / OVERNIGHT EVENTS    No acute events overnight. Acid reflux has gotten somewhat better overnight.  Diarrhea 4x/day, epistaxis 2x/day remains unchanged. Headache, epigastric pain, and buccal mucosa sores remain unchanged. Patient appears more jaundiced than prior days, with visible scleral icterus. Notes LE cramping has improved but still persists, and is ambulating regularly.    OBJECTIVE  Physical exam:  Visit Vitals  /57 (BP Location: LUE - Left upper extremity, Patient Position: Sitting)   Pulse 68   Temp 97.7 °F (36.5 °C) (Oral)   Resp 18   Ht 5' 3\" (1.6 m)   Wt 94.9 kg (209 lb 4.8 oz)   LMP 05/05/2022 (Approximate)   SpO2 98%   BMI 37.08 kg/m²       Physical Exam  Constitutional:       Appearance: Normal appearance. She is obese.   HENT:      Head: Normocephalic.      Right Ear: External ear normal.      Left Ear: External ear normal.      Nose: Nose normal.      Mouth/Throat:      Comments: Erythematous macules on buccal mucosa  Eyes:      General: Scleral icterus present.      Extraocular Movements: Extraocular movements intact.      Conjunctiva/sclera: Conjunctivae normal.      Pupils: Pupils are equal, round, and reactive to light.   Cardiovascular:      Rate and Rhythm: Normal rate and regular rhythm.      Pulses: Normal pulses.      Heart sounds: Normal heart sounds.      Comments: Visible JVD when sitting upright  Pulmonary:      Effort: Pulmonary effort is normal.      Breath sounds: Normal breath sounds.   Abdominal:      General: Abdomen is flat. Bowel sounds are normal. There is distension.      Tenderness: There is abdominal tenderness.   Musculoskeletal:         General: Normal range of motion.      Cervical back: Normal range of motion.      Right lower leg: Edema present.      Left lower leg: Edema present.   Skin:     General: Skin is warm and dry.      Coloration: Skin is jaundiced.      Comments: Spider angiomas noted under left clavicle   Neurological:      General: No focal deficit present.      Mental Status: She is alert and oriented to person, place, and time.          I&O'S /  LABS / IMAGING  I/Os:     Intake/Output Summary (Last 24 hours) at 7/12/2022 0738  Last data filed at 7/11/2022 0930  Gross per 24 hour   Intake 100 ml   Output --   Net 100 ml       LAB RESULTS  Recent Labs   Lab 07/12/22  0520 07/11/22  1029   WBC 3.4* 3.7*   HCT 37.1 38.8   HGB 11.4* 12.3   PLT 35* 34*     Recent Labs   Lab 07/12/22  0520 07/11/22  1029   SODIUM 143 140   POTASSIUM 3.9 3.6   CHLORIDE 115* 113*   CO2 25 24   GLUCOSE 85 101*   BUN 7 6   CREATININE 0.58 0.57       IMAGING  XR Chest AP or PA    Result Date: 7/7/2022  Narrative: XR CHEST AP OR PA HISTORY: 43 year-old, Female presented with sob COMPARISON: Multiple prior radiographs, most recently 3/29/2022 and 9/20/2020. CT chest 10/7/2020. TECHNIQUE:  Single, PA upright view of the chest. FINDINGS: The cardiomediastinal contour is within normal limits. Mild pulmonary vasculature prominence, relatively similar comparison to prior exam 3/29/2022. No focal consolidation. No definite pleural effusion. No pneumothorax. Upper abdominal TIPS additional coil-like material overlying the mid abdomen.     Impression: IMPRESSION: 1.  No acute focal consolidation. 2.  Mild pulmonary vasculature prominence, relatively similar comparison to prior exam. I, Attending Radiologist Vito Pacheco MD, have reviewed the images and report and concur with these findings interpreted by Resident Radiologist, Manpreet Mcgovern MD.     US TIPSS AND DUPLEX    Result Date: 6/30/2022  Narrative: EXAM:  US TIPSS AND DUPLEX INDICATION: EtOH Cirrhosis, S/P TIPSS, HCC screening TECHNIQUE:  Grayscale, color Doppler and spectral Doppler ultrasound of the liver and TIPS were performed. COMPARISON: 4/15/2022 FINDINGS: GRAYSCALE FINDINGS: Again noted is nodular contour of the liver with heterogeneous echogenicity compatible with known cirrhosis. There is no focal lesion identified within the limits of ultrasound. There is no intrahepatic biliary dilatation. Hepatopedal flow is noted in the  main and left portal veins with expected hepatofugal flow in the right portal vein. HEPATIC COLOR FLOW AND SPECTRAL DOPPLER FINDINGS: TIPS is widely patent with appropriate directional flow. HEPATIC SPECTRAL DOPPLER MEASUREMENTS (IN CENTIMETERS PER SECOND): Main portal vein: 44 TIPS portal: 56 TIPS mid: 133 TIPS hepatic: 172 GALLBLADDER: Unremarkable. COMMON BILE DUCT: 2.0 mm. PANCREAS: Limited views of the head are unremarkable. RIGHT KIDNEY: Survey images demonstrate no hydronephrosis. SPLEEN: Splenomegaly measuring 16.4 cm. OTHER: There is no free fluid in the upper abdomen.     Impression: IMPRESSION: Patent TIPS with appropriate velocities and no significant change from prior. No identified hepatic lesion.      ASSESSMENT & PLAN    43 year old female with PMH of alcoholic liver cirrhosis, alcohol abuse, HLD, HTN, DM2, and depression who presented to ED on 7/7 due to abnormal LFTs on 7/5 and a Maddrey score of 39.36 which prompted evaluation for steroid intervention. Consult hepatology for further steps.    #Alcoholic Liver Cirrhosis  - 07/05 labs Maddrey score was 39.36  - T. Khanh 6.7, AST 67, Alk Phos 167, Albumin 2.0, WBC 3.4, PLT 39118. Labs have remained consistent for past 3 months  - Scleral icterus, jaundice, spider angiomas, and JVD when seated upright  - Vomited and felt short of breath after IV Vit K, was discontinued  - Hepatology on consult, planning to reassess need for steroids in outpatient f/u  - Urine culture showed asymptomatic E. Coli with mixed andrey, started on ceftriaxone  - PPI daily and coreg (portal HTN). Given exacerbated GERD, order Tums  - Plan for EGD with Dr. Marrero  - MVI/folate/thiamine  - Fluid restricted diet, 2g Na     #Hepatic Encephalopathy  - Grade 0 currently  - Chronic, secondary to cirrhosis  - Rifaximin/Lactulose    #Headaches  -Excedrin trial  -Tylenol does not work  -Avoid NSAID due to bleeding risk  -Tramadol PRN     #HTN  - Carvedilol    #Hypokalemia (Resolved)  -  3.9 on 7/12   - 40 meq daily replete till potassium reaches 4    #Depression/Mood Disturbance  -Escitalopram  -Buproprion    #Chronic Back and Leg Pain/Spasms  - This is either due to the cirrhosis or hypokalemia  - Acetaminophen, baclofen    #Alcohol Dependence  - Last drink few months ago  - CIWA protocol    #HCC Screening  - US TIPSS 4/15/22 no focal findings. TIPS with appropriate velocities  - AFP 7 (3/7/22)  -10/22 follow up imaging, follow up with PCP    #Nutrition  - Fluid restricted diet    #Epistaxis  - 2 episodes yesterday, down from 4-5 in days prior  - Avoid NSAIDS (bleeding risk)  - Elevated INR and low platelet count increase risk of recurrent epistaxis  - Adverse reaction to vitamin K administration  - Recommend vitamin K rich diet  - If epistaxis does not improve, consider consult with ENT    #GERD  -Endorses an intermittent history of GERD symptoms over past few months  -Has gotten worse over last few weeks  -Noted exacerbation of symptoms last night, having to get up every hour or so and change position  -Likely due to portal HTN  -On pantoprazole and coreg  -Plan for EGD with Dr. Marrero on 7/13    Disposition: Plan for discharge today          DVT/VTE Prophylaxis:  VTE Pharmacologic Prophylaxis: No (low platelets)  VTE Mechanical Prophylaxis: Yes     Cliff Kaye MS3  Internal Medicine Teaching Service  7/12/2022 7:38 AM   Pager: [phone]    Please contact the cross cover pager ([phone] or ) for questions between 7PM to 7AM Monday through Friday and between 11AM to 7AM on the weekends. Thank you.         [facility]-L[facility]D

## 2025-03-12 NOTE — OB RN TRIAGE NOTE - NS_TRIAGETIMEOFMEDICALSCREENING_OBGYN_ALL_OB_DT
"Patient returned call and reviewed symptoms, biggest complaint was \"itchy/burning hands and all over\", and a bloating around abdomen. Patient feels like she is retaining water more, not going to the bathroom as much and very thirsty. Patient did not take today's dose.     Conveyed Dr. Gutiérrez's note to the patient. She stated she has a lab appt scheduled for Friday already. Offered to make the appointment earlier so she could get results before the weekend. Patient stated the earliest she should come in is on Thursday. Appt rescheduled.  Patient plans to try Benadryl to see if it calms the itching down. She was mostly concerned if she should continue with the medication.   Advised patient that Dr. Gutiérrez did not advise stopping. Patient stated understanding and is in agreement with plan.    Gilma SOW Triage RN      " 12-Mar-2025 18:26

## 2025-03-12 NOTE — ED ADULT TRIAGE NOTE - CHIEF COMPLAINT QUOTE
Presents for vomiting bright red blood x2 since this AM. She states "half cup" quantity." Denies taking drugs, alcohol. She is . 30 weeks pregnant. RANCHO May 21, 2025. History of asthma, sickle cell trait. L&D called, patient to be seen medically in ED.

## 2025-03-12 NOTE — OB PROVIDER TRIAGE NOTE - NSHPPHYSICALEXAM_GEN_ALL_CORE
Assessment reveals VSS, abdomen soft, NT, gravid.   NST  Baseline  ( 135 ) BPM  Variability (  x)  Moderate   (  ) Minimal  (  ) Absent  (  )  Marked  Accelerations (x  ) 15x15   (  ) 10x10  (  ) no  Decelerations (x  ) no  (  ) Variable  (  ) Early  (  ) Late      Description _________  Contractions ( x) no  (  ) yes     Description  __________  Interpretation (  x) reactive   (  )  non-reactive  Cat 1 FHT  BPP 8/8, EVELIO 19.2, vtx, fundal placenta-saved in asob  SSE- cervix appears closed  CL 4.5 no funneling or dynamic changes       Chaparoned by ANDREW Oquendo RN

## 2025-03-12 NOTE — CONSULT NOTE ADULT - ATTENDING COMMENTS
34F,  30wk gestation, rest of hx as above, admitted w n/v.  Patient states she has had a cold for a few days with symptoms of cough   She had one episode of nonbloody emesis last night and one this morning.   A second episode this morning contained saliva mixed in w small amt of blood. She approximates less than a spoonful.   No prior GI bleed. No prior EGD   She has had diet controlled GERD throughout her pregnancy.   She is on iron infusions as outpatient for anemia.   Outpatient labs show hgb 9.5 on 3/11.   Hgb on arrival to ED is 10.4    Low suspicion at this time for ongoing active GI bleeding   May be hemoptysis in setting of cold/cough vs esophagitis/MWT   Recommend conservative management for now   Risks likely outweigh benefits in this patient as she does not have ongoing evidence of bleeding and has a stable hgb   Would recommend to trend h/h  monitor bowel movements   start PPI if ok w OB team   avoid nsaids   discussed w patient and mom who agree and elect to proceed w conservative management   OB c/s   GI to follow, please call w questions

## 2025-03-13 DIAGNOSIS — O99.513 DISEASES OF THE RESPIRATORY SYSTEM COMPLICATING PREGNANCY, THIRD TRIMESTER: ICD-10-CM

## 2025-03-13 DIAGNOSIS — D21.9 BENIGN NEOPLASM OF CONNECTIVE AND OTHER SOFT TISSUE, UNSPECIFIED: ICD-10-CM

## 2025-03-13 DIAGNOSIS — O99.013 ANEMIA COMPLICATING PREGNANCY, THIRD TRIMESTER: ICD-10-CM

## 2025-03-13 DIAGNOSIS — O99.891 OTHER SPECIFIED DISEASES AND CONDITIONS COMPLICATING PREGNANCY: ICD-10-CM

## 2025-03-13 DIAGNOSIS — O47.03 FALSE LABOR BEFORE 37 COMPLETED WEEKS OF GESTATION, THIRD TRIMESTER: ICD-10-CM

## 2025-03-13 DIAGNOSIS — J45.909 UNSPECIFIED ASTHMA, UNCOMPLICATED: ICD-10-CM

## 2025-03-13 DIAGNOSIS — Z3A.30 30 WEEKS GESTATION OF PREGNANCY: ICD-10-CM

## 2025-03-13 DIAGNOSIS — D57.3 SICKLE-CELL TRAIT: ICD-10-CM

## 2025-03-25 ENCOUNTER — ASOB RESULT (OUTPATIENT)
Age: 35
End: 2025-03-25

## 2025-03-25 ENCOUNTER — APPOINTMENT (OUTPATIENT)
Dept: ANTEPARTUM | Facility: CLINIC | Age: 35
End: 2025-03-25
Payer: COMMERCIAL

## 2025-03-25 PROCEDURE — 76816 OB US FOLLOW-UP PER FETUS: CPT

## 2025-04-01 ENCOUNTER — RESULT REVIEW (OUTPATIENT)
Age: 35
End: 2025-04-01

## 2025-04-01 ENCOUNTER — OUTPATIENT (OUTPATIENT)
Dept: OUTPATIENT SERVICES | Facility: HOSPITAL | Age: 35
LOS: 1 days | End: 2025-04-01
Payer: COMMERCIAL

## 2025-04-01 VITALS
RESPIRATION RATE: 18 BRPM | DIASTOLIC BLOOD PRESSURE: 73 MMHG | HEART RATE: 97 BPM | SYSTOLIC BLOOD PRESSURE: 110 MMHG | TEMPERATURE: 98 F

## 2025-04-01 DIAGNOSIS — L98.9 DISORDER OF THE SKIN AND SUBCUTANEOUS TISSUE, UNSPECIFIED: Chronic | ICD-10-CM

## 2025-04-01 DIAGNOSIS — O26.899 OTHER SPECIFIED PREGNANCY RELATED CONDITIONS, UNSPECIFIED TRIMESTER: ICD-10-CM

## 2025-04-01 LAB
ALBUMIN SERPL ELPH-MCNC: 2.8 G/DL — LOW (ref 3.5–5)
ALP SERPL-CCNC: 100 U/L — SIGNIFICANT CHANGE UP (ref 40–120)
ALT FLD-CCNC: 22 U/L DA — SIGNIFICANT CHANGE UP (ref 10–60)
ANION GAP SERPL CALC-SCNC: 5 MMOL/L — SIGNIFICANT CHANGE UP (ref 5–17)
APPEARANCE UR: CLEAR — SIGNIFICANT CHANGE UP
AST SERPL-CCNC: 24 U/L — SIGNIFICANT CHANGE UP (ref 10–40)
BASOPHILS # BLD AUTO: 0.03 K/UL — SIGNIFICANT CHANGE UP (ref 0–0.2)
BASOPHILS NFR BLD AUTO: 0.4 % — SIGNIFICANT CHANGE UP (ref 0–2)
BILIRUB SERPL-MCNC: 0.6 MG/DL — SIGNIFICANT CHANGE UP (ref 0.2–1.2)
BILIRUB UR-MCNC: NEGATIVE — SIGNIFICANT CHANGE UP
BUN SERPL-MCNC: 9 MG/DL — SIGNIFICANT CHANGE UP (ref 7–18)
CALCIUM SERPL-MCNC: 9.7 MG/DL — SIGNIFICANT CHANGE UP (ref 8.4–10.5)
CHLORIDE SERPL-SCNC: 105 MMOL/L — SIGNIFICANT CHANGE UP (ref 96–108)
CO2 SERPL-SCNC: 24 MMOL/L — SIGNIFICANT CHANGE UP (ref 22–31)
COLOR SPEC: YELLOW — SIGNIFICANT CHANGE UP
CREAT SERPL-MCNC: 0.7 MG/DL — SIGNIFICANT CHANGE UP (ref 0.5–1.3)
DIFF PNL FLD: NEGATIVE — SIGNIFICANT CHANGE UP
EGFR: 117 ML/MIN/1.73M2 — SIGNIFICANT CHANGE UP
EGFR: 117 ML/MIN/1.73M2 — SIGNIFICANT CHANGE UP
EOSINOPHIL # BLD AUTO: 0.21 K/UL — SIGNIFICANT CHANGE UP (ref 0–0.5)
EOSINOPHIL NFR BLD AUTO: 3 % — SIGNIFICANT CHANGE UP (ref 0–6)
GLUCOSE SERPL-MCNC: 83 MG/DL — SIGNIFICANT CHANGE UP (ref 70–99)
GLUCOSE UR QL: NEGATIVE MG/DL — SIGNIFICANT CHANGE UP
HCT VFR BLD CALC: 35 % — SIGNIFICANT CHANGE UP (ref 34.5–45)
HGB BLD-MCNC: 11.6 G/DL — SIGNIFICANT CHANGE UP (ref 11.5–15.5)
IMM GRANULOCYTES NFR BLD AUTO: 0.3 % — SIGNIFICANT CHANGE UP (ref 0–0.9)
KETONES UR-MCNC: NEGATIVE MG/DL — SIGNIFICANT CHANGE UP
LEUKOCYTE ESTERASE UR-ACNC: NEGATIVE — SIGNIFICANT CHANGE UP
LYMPHOCYTES # BLD AUTO: 1.55 K/UL — SIGNIFICANT CHANGE UP (ref 1–3.3)
LYMPHOCYTES # BLD AUTO: 22.5 % — SIGNIFICANT CHANGE UP (ref 13–44)
MCHC RBC-ENTMCNC: 26.6 PG — LOW (ref 27–34)
MCHC RBC-ENTMCNC: 33.1 G/DL — SIGNIFICANT CHANGE UP (ref 32–36)
MCV RBC AUTO: 80.3 FL — SIGNIFICANT CHANGE UP (ref 80–100)
MONOCYTES # BLD AUTO: 0.7 K/UL — SIGNIFICANT CHANGE UP (ref 0–0.9)
MONOCYTES NFR BLD AUTO: 10.1 % — SIGNIFICANT CHANGE UP (ref 2–14)
NEUTROPHILS # BLD AUTO: 4.39 K/UL — SIGNIFICANT CHANGE UP (ref 1.8–7.4)
NEUTROPHILS NFR BLD AUTO: 63.7 % — SIGNIFICANT CHANGE UP (ref 43–77)
NITRITE UR-MCNC: NEGATIVE — SIGNIFICANT CHANGE UP
NRBC BLD AUTO-RTO: 0 /100 WBCS — SIGNIFICANT CHANGE UP (ref 0–0)
PH UR: 6.5 — SIGNIFICANT CHANGE UP (ref 5–8)
PLATELET # BLD AUTO: 350 K/UL — SIGNIFICANT CHANGE UP (ref 150–400)
POTASSIUM SERPL-MCNC: 4 MMOL/L — SIGNIFICANT CHANGE UP (ref 3.5–5.3)
POTASSIUM SERPL-SCNC: 4 MMOL/L — SIGNIFICANT CHANGE UP (ref 3.5–5.3)
PROT SERPL-MCNC: 6.9 G/DL — SIGNIFICANT CHANGE UP (ref 6–8.3)
PROT UR-MCNC: NEGATIVE MG/DL — SIGNIFICANT CHANGE UP
RBC # BLD: 4.36 M/UL — SIGNIFICANT CHANGE UP (ref 3.8–5.2)
RBC # FLD: 20 % — HIGH (ref 10.3–14.5)
SODIUM SERPL-SCNC: 134 MMOL/L — LOW (ref 135–145)
SP GR SPEC: 1.01 — SIGNIFICANT CHANGE UP (ref 1–1.03)
UROBILINOGEN FLD QL: 1 MG/DL — SIGNIFICANT CHANGE UP (ref 0.2–1)
WBC # BLD: 6.9 K/UL — SIGNIFICANT CHANGE UP (ref 3.8–10.5)
WBC # FLD AUTO: 6.9 K/UL — SIGNIFICANT CHANGE UP (ref 3.8–10.5)

## 2025-04-01 PROCEDURE — 96374 THER/PROPH/DIAG INJ IV PUSH: CPT

## 2025-04-01 PROCEDURE — G0463: CPT

## 2025-04-01 PROCEDURE — 81003 URINALYSIS AUTO W/O SCOPE: CPT

## 2025-04-01 PROCEDURE — 36415 COLL VENOUS BLD VENIPUNCTURE: CPT

## 2025-04-01 PROCEDURE — 96361 HYDRATE IV INFUSION ADD-ON: CPT

## 2025-04-01 PROCEDURE — 76819 FETAL BIOPHYS PROFIL W/O NST: CPT | Mod: 26

## 2025-04-01 PROCEDURE — 80053 COMPREHEN METABOLIC PANEL: CPT

## 2025-04-01 PROCEDURE — 59025 FETAL NON-STRESS TEST: CPT

## 2025-04-01 PROCEDURE — 99222 1ST HOSP IP/OBS MODERATE 55: CPT

## 2025-04-01 PROCEDURE — 76819 FETAL BIOPHYS PROFIL W/O NST: CPT

## 2025-04-01 PROCEDURE — 85025 COMPLETE CBC W/AUTO DIFF WBC: CPT

## 2025-04-01 RX ORDER — ACETAMINOPHEN 500 MG/5ML
975 LIQUID (ML) ORAL ONCE
Refills: 0 | Status: COMPLETED | OUTPATIENT
Start: 2025-04-01 | End: 2025-04-01

## 2025-04-01 RX ORDER — ACETAMINOPHEN 500 MG/5ML
1000 LIQUID (ML) ORAL ONCE
Refills: 0 | Status: COMPLETED | OUTPATIENT
Start: 2025-04-01 | End: 2025-04-01

## 2025-04-01 RX ORDER — SODIUM CHLORIDE 9 G/1000ML
1000 INJECTION, SOLUTION INTRAVENOUS ONCE
Refills: 0 | Status: COMPLETED | OUTPATIENT
Start: 2025-04-01 | End: 2025-04-01

## 2025-04-01 RX ADMIN — Medication 975 MILLIGRAM(S): at 15:55

## 2025-04-01 RX ADMIN — Medication 400 MILLIGRAM(S): at 18:00

## 2025-04-01 RX ADMIN — SODIUM CHLORIDE 1000 MILLILITER(S): 9 INJECTION, SOLUTION INTRAVENOUS at 18:00

## 2025-04-01 NOTE — OB PROVIDER TRIAGE NOTE - NSHPPHYSICALEXAM_GEN_ALL_CORE
HR: 97 (01 Apr 2025 14:40) (97 - 97)  BP: 110/73 (01 Apr 2025 14:40) (110/73 - 110/73)  -  RR: 18 (01 Apr 2025 14:40) (18 - 18)  SpO2: --    Ultrasound 3/25/25:  cephalic presentation   Fibroid right lateral 9.8 x 6.3 x 4.7     EFM: 140 baseline, moderate variability, + accels, no decels   [place]: Irregular contractions.     Abd: soft, tender to palpation over fibroid   Cervix: c/l/p

## 2025-04-01 NOTE — OB PROVIDER TRIAGE NOTE - NSOBPROVIDERNOTE_OBGYN_ALL_OB_FT
- discussed w HESHAM Peralta: pt is not candidate for indomethacin management due to gest age is 32+weeks ; offered opiods  - awaiting urinalysis and labs     pt declined opiods-only wants tylenol; pt declines iv hydration    - dr lomas notified:     -  labor precautions; pt has appt w hesham on 4/15 for follow up    - continue prenatal vitamins   if water breaks contractions noted vaginal bleeding noted return to delivery room  daily check the baby movements with fetal kick count - discussed w HESHAM Peralta: pt is not candidate for indomethacin management due to gest age is 32+weeks ; offered opiods  - awaiting urinalysis and labs     pt declined opiods-only wants tylenol;  iv fluids given    - dr lomas notified:     -  labor precautions; pt has appt w hesham on 4/15 for follow up    - continue prenatal vitamins   if water breaks contractions noted vaginal bleeding noted return to delivery room  daily check the baby movements with fetal kick count - discussed w HESHAM Peralta: pt is not candidate for indomethacin management due to gest age is 32+weeks ; offered opiods  - awaiting urinalysis and labs     pt declined opioids -only wants tylenol;  iv fluids given    - dr lomas notified:     -  labor precautions; pt has appt w hesham on 4/15 for follow up    - continue prenatal vitamins   if water breaks contractions noted vaginal bleeding noted return to delivery room  daily check the baby movements with fetal kick count

## 2025-04-01 NOTE — OB PROVIDER TRIAGE NOTE - NSHPLABSRESULTS_GEN_ALL_CORE
11.6   6.90  )-----------( 350      ( 01 Apr 2025 16:58 )             35.0     04-01    134[L]  |  105  |  9   ----------------------------<  83  4.0   |  24  |  0.70    Ca    9.7      01 Apr 2025 16:58    TPro  6.9  /  Alb  2.8[L]  /  TBili  0.6  /  DBili  x   /  AST  24  /  ALT  22  /  AlkPhos  100  04-01    Urinalysis with Rflx Culture (04.01.25 @ 16:58)   Urine Appearance: Clear  Color: Yellow  Specific Gravity: 1.010  pH Urine: 6.5  Protein, Urine: Negative mg/dL  Glucose Qualitative, Urine: Negative mg/dL  Ketone - Urine: Negative mg/dL  Blood, Urine: Negative  Bilirubin: Negative  Urobilinogen: 1.0 mg/dL  Leukocyte Esterase Concentration: Negative  Nitrite: Negative

## 2025-04-01 NOTE — OB PROVIDER TRIAGE NOTE - ADDITIONAL INSTRUCTIONS
- discussed w HESHAM Peralta: pt is not candidate for indomethacin management due to gest age is 32+weeks ; offered opioids  - laboratory results normal; urinalysis negative     pt declined opioids -only wants Tylenol-iv tylenol given;  iv fluids given    - dr lomas notified: f/u ob check prenatal care in office next week    -  labor precautions; pt has appointment w hesham on 4/15 for follow up    - continue prenatal vitamins   if water breaks contractions noted vaginal bleeding noted return to delivery room  daily check the baby movements with fetal kick count

## 2025-04-01 NOTE — OB PROVIDER TRIAGE NOTE - YOU WERE IN THE HOSPITAL FOR:
- discussed w HESHAM Peralta: pt is not candidate for indomethacin management due to gest age is 32+weeks ; offered opioids  - laboratory results normal; urinalysis negative     pt declined opioids -only wants Tylenol-iv tylenol given;  iv fluids given    - dr lomas notified: follow up next week for OB prenatal care check     -  labor precautions; pt has appointment w hesham on 4/15 for follow up    - continue prenatal vitamins   if water breaks contractions noted vaginal bleeding noted return to delivery room  daily check the baby movements with fetal kick count

## 2025-04-01 NOTE — OB PROVIDER TRIAGE NOTE - HISTORY OF PRESENT ILLNESS
Patient is a 34 year old G1PO Female at 32w6d gestational age who presents today for abdominal pain. Pt states pain started yesterday at 6pm. She took 500mg of Tylenol with not improvement. Pt states pain in back, radiation into abdomen and lower pelvis. Pt states the pain comes and goes ever couple of minutes but has remained consistent since last night. Patient denies nausea, vomiting, dysuria, or increased frequency. Pt denies HA, chest pain, SOB. Pt endorsed good fetal movement, denies vaginal bleeding or leakage of fluid. Pt reports last intercourse months ago. Pt has received 5 doses of IV Iron thus far during pregnancy for anemia.     pnc dr lomas    f/u mfm: noted mfm sonogram fibroids right lateral 10x6cm    PMHx: hx of anemia, hx of asthma, hx of uterine fibroids   PSHx: facial cyst removed in 1998   PFHx: SCT   Meds: Prenatal vitamin, albuterol inhaler PRN, tylenol PRN   Allergies: Latex     ROS: negative unless noted in HPI

## 2025-04-03 DIAGNOSIS — J45.909 UNSPECIFIED ASTHMA, UNCOMPLICATED: ICD-10-CM

## 2025-04-03 DIAGNOSIS — D25.9 LEIOMYOMA OF UTERUS, UNSPECIFIED: ICD-10-CM

## 2025-04-03 DIAGNOSIS — D57.3 SICKLE-CELL TRAIT: ICD-10-CM

## 2025-04-03 DIAGNOSIS — O26.893 OTHER SPECIFIED PREGNANCY RELATED CONDITIONS, THIRD TRIMESTER: ICD-10-CM

## 2025-04-03 DIAGNOSIS — Z3A.32 32 WEEKS GESTATION OF PREGNANCY: ICD-10-CM

## 2025-04-03 DIAGNOSIS — R10.2 PELVIC AND PERINEAL PAIN: ICD-10-CM

## 2025-04-03 DIAGNOSIS — O34.13 MATERNAL CARE FOR BENIGN TUMOR OF CORPUS UTERI, THIRD TRIMESTER: ICD-10-CM

## 2025-04-03 DIAGNOSIS — O99.013 ANEMIA COMPLICATING PREGNANCY, THIRD TRIMESTER: ICD-10-CM

## 2025-04-03 DIAGNOSIS — O99.513 DISEASES OF THE RESPIRATORY SYSTEM COMPLICATING PREGNANCY, THIRD TRIMESTER: ICD-10-CM

## 2025-04-03 DIAGNOSIS — O99.891 OTHER SPECIFIED DISEASES AND CONDITIONS COMPLICATING PREGNANCY: ICD-10-CM

## 2025-04-03 DIAGNOSIS — D64.9 ANEMIA, UNSPECIFIED: ICD-10-CM

## 2025-04-07 ENCOUNTER — APPOINTMENT (OUTPATIENT)
Facility: CLINIC | Age: 35
End: 2025-04-07

## 2025-04-07 ENCOUNTER — NON-APPOINTMENT (OUTPATIENT)
Age: 35
End: 2025-04-07

## 2025-04-07 VITALS
DIASTOLIC BLOOD PRESSURE: 68 MMHG | SYSTOLIC BLOOD PRESSURE: 114 MMHG | WEIGHT: 167 LBS | HEIGHT: 68 IN | BODY MASS INDEX: 25.31 KG/M2

## 2025-04-07 DIAGNOSIS — Z87.09 PERSONAL HISTORY OF OTHER DISEASES OF THE RESPIRATORY SYSTEM: ICD-10-CM

## 2025-04-07 PROCEDURE — 99459 PELVIC EXAMINATION: CPT

## 2025-04-07 PROCEDURE — 99204 OFFICE O/P NEW MOD 45 MIN: CPT | Mod: 25

## 2025-04-08 LAB
C TRACH RRNA SPEC QL NAA+PROBE: NOT DETECTED
N GONORRHOEA RRNA SPEC QL NAA+PROBE: NOT DETECTED
SOURCE AMPLIFICATION: NORMAL

## 2025-04-10 LAB — BACTERIA UR CULT: NORMAL

## 2025-04-15 ENCOUNTER — NON-APPOINTMENT (OUTPATIENT)
Age: 35
End: 2025-04-15

## 2025-04-15 ENCOUNTER — APPOINTMENT (OUTPATIENT)
Dept: ANTEPARTUM | Facility: CLINIC | Age: 35
End: 2025-04-15
Payer: COMMERCIAL

## 2025-04-15 DIAGNOSIS — Z78.9 OTHER SPECIFIED HEALTH STATUS: ICD-10-CM

## 2025-04-15 DIAGNOSIS — Z80.3 FAMILY HISTORY OF MALIGNANT NEOPLASM OF BREAST: ICD-10-CM

## 2025-04-16 ENCOUNTER — APPOINTMENT (OUTPATIENT)
Facility: CLINIC | Age: 35
End: 2025-04-16
Payer: COMMERCIAL

## 2025-04-16 VITALS — DIASTOLIC BLOOD PRESSURE: 75 MMHG | WEIGHT: 172 LBS | BODY MASS INDEX: 26.15 KG/M2 | SYSTOLIC BLOOD PRESSURE: 106 MMHG

## 2025-04-16 PROCEDURE — 0502F SUBSEQUENT PRENATAL CARE: CPT

## 2025-04-22 ENCOUNTER — ASOB RESULT (OUTPATIENT)
Age: 35
End: 2025-04-22

## 2025-04-22 ENCOUNTER — APPOINTMENT (OUTPATIENT)
Dept: ANTEPARTUM | Facility: CLINIC | Age: 35
End: 2025-04-22
Payer: COMMERCIAL

## 2025-04-22 VITALS — HEART RATE: 68 BPM | DIASTOLIC BLOOD PRESSURE: 50 MMHG | SYSTOLIC BLOOD PRESSURE: 95 MMHG

## 2025-04-22 PROCEDURE — 76816 OB US FOLLOW-UP PER FETUS: CPT

## 2025-04-23 ENCOUNTER — APPOINTMENT (OUTPATIENT)
Facility: CLINIC | Age: 35
End: 2025-04-23
Payer: COMMERCIAL

## 2025-04-23 PROCEDURE — 0501F PRENATAL FLOW SHEET: CPT

## 2025-04-25 LAB
GP B STREP DNA SPEC QL NAA+PROBE: NOT DETECTED
SOURCE GBS: NORMAL

## 2025-04-30 ENCOUNTER — APPOINTMENT (OUTPATIENT)
Facility: CLINIC | Age: 35
End: 2025-04-30
Payer: COMMERCIAL

## 2025-04-30 PROCEDURE — 0502F SUBSEQUENT PRENATAL CARE: CPT

## 2025-05-01 ENCOUNTER — NON-APPOINTMENT (OUTPATIENT)
Age: 35
End: 2025-05-01

## 2025-05-01 ENCOUNTER — APPOINTMENT (OUTPATIENT)
Dept: PULMONOLOGY | Facility: CLINIC | Age: 35
End: 2025-05-01
Payer: COMMERCIAL

## 2025-05-01 VITALS
HEIGHT: 68 IN | SYSTOLIC BLOOD PRESSURE: 115 MMHG | HEART RATE: 69 BPM | WEIGHT: 168 LBS | DIASTOLIC BLOOD PRESSURE: 78 MMHG | OXYGEN SATURATION: 100 % | BODY MASS INDEX: 25.46 KG/M2

## 2025-05-01 DIAGNOSIS — J45.909 UNSPECIFIED ASTHMA, UNCOMPLICATED: ICD-10-CM

## 2025-05-01 PROCEDURE — 99204 OFFICE O/P NEW MOD 45 MIN: CPT | Mod: 25

## 2025-05-01 PROCEDURE — 94010 BREATHING CAPACITY TEST: CPT

## 2025-05-07 ENCOUNTER — APPOINTMENT (OUTPATIENT)
Dept: ANTEPARTUM | Facility: CLINIC | Age: 35
End: 2025-05-07

## 2025-05-07 ENCOUNTER — INPATIENT (INPATIENT)
Facility: HOSPITAL | Age: 35
LOS: 2 days | Discharge: HOME | End: 2025-05-10
Attending: SPECIALIST | Admitting: SPECIALIST
Payer: COMMERCIAL

## 2025-05-07 VITALS
TEMPERATURE: 98 F | RESPIRATION RATE: 16 BRPM | SYSTOLIC BLOOD PRESSURE: 113 MMHG | DIASTOLIC BLOOD PRESSURE: 70 MMHG | HEART RATE: 72 BPM

## 2025-05-07 DIAGNOSIS — L98.9 DISORDER OF THE SKIN AND SUBCUTANEOUS TISSUE, UNSPECIFIED: Chronic | ICD-10-CM

## 2025-05-07 DIAGNOSIS — O26.899 OTHER SPECIFIED PREGNANCY RELATED CONDITIONS, UNSPECIFIED TRIMESTER: ICD-10-CM

## 2025-05-07 LAB
BASOPHILS # BLD AUTO: 0.02 K/UL — SIGNIFICANT CHANGE UP (ref 0–0.2)
BASOPHILS NFR BLD AUTO: 0.3 % — SIGNIFICANT CHANGE UP (ref 0–2)
BLD GP AB SCN SERPL QL: NEGATIVE — SIGNIFICANT CHANGE UP
EOSINOPHIL # BLD AUTO: 0.17 K/UL — SIGNIFICANT CHANGE UP (ref 0–0.5)
EOSINOPHIL NFR BLD AUTO: 2.6 % — SIGNIFICANT CHANGE UP (ref 0–6)
HCT VFR BLD CALC: 36.9 % — SIGNIFICANT CHANGE UP (ref 34.5–45)
HGB BLD-MCNC: 12.3 G/DL — SIGNIFICANT CHANGE UP (ref 11.5–15.5)
IANC: 4.16 K/UL — SIGNIFICANT CHANGE UP (ref 1.8–7.4)
IMM GRANULOCYTES NFR BLD AUTO: 0.5 % — SIGNIFICANT CHANGE UP (ref 0–0.9)
LYMPHOCYTES # BLD AUTO: 1.51 K/UL — SIGNIFICANT CHANGE UP (ref 1–3.3)
LYMPHOCYTES # BLD AUTO: 23.1 % — SIGNIFICANT CHANGE UP (ref 13–44)
MCHC RBC-ENTMCNC: 26.3 PG — LOW (ref 27–34)
MCHC RBC-ENTMCNC: 33.3 G/DL — SIGNIFICANT CHANGE UP (ref 32–36)
MCV RBC AUTO: 78.8 FL — LOW (ref 80–100)
MONOCYTES # BLD AUTO: 0.64 K/UL — SIGNIFICANT CHANGE UP (ref 0–0.9)
MONOCYTES NFR BLD AUTO: 9.8 % — SIGNIFICANT CHANGE UP (ref 2–14)
NEUTROPHILS # BLD AUTO: 4.16 K/UL — SIGNIFICANT CHANGE UP (ref 1.8–7.4)
NEUTROPHILS NFR BLD AUTO: 63.7 % — SIGNIFICANT CHANGE UP (ref 43–77)
NRBC # BLD AUTO: 0 K/UL — SIGNIFICANT CHANGE UP (ref 0–0)
NRBC # FLD: 0 K/UL — SIGNIFICANT CHANGE UP (ref 0–0)
NRBC BLD AUTO-RTO: 0 /100 WBCS — SIGNIFICANT CHANGE UP (ref 0–0)
PLATELET # BLD AUTO: 355 K/UL — SIGNIFICANT CHANGE UP (ref 150–400)
RBC # BLD: 4.68 M/UL — SIGNIFICANT CHANGE UP (ref 3.8–5.2)
RBC # FLD: 18.1 % — HIGH (ref 10.3–14.5)
RH IG SCN BLD-IMP: POSITIVE — SIGNIFICANT CHANGE UP
WBC # BLD: 6.53 K/UL — SIGNIFICANT CHANGE UP (ref 3.8–10.5)
WBC # FLD AUTO: 6.53 K/UL — SIGNIFICANT CHANGE UP (ref 3.8–10.5)

## 2025-05-07 RX ORDER — MOMETASONE FUROATE 220 UG/1
1 INHALANT RESPIRATORY (INHALATION) DAILY
Refills: 0 | Status: DISCONTINUED | OUTPATIENT
Start: 2025-05-07 | End: 2025-05-07

## 2025-05-07 RX ORDER — OXYTOCIN-SODIUM CHLORIDE 0.9% IV SOLN 30 UNIT/500ML 30-0.9/5 UT/ML-%
SOLUTION INTRAVENOUS
Qty: 30 | Refills: 0 | Status: DISCONTINUED | OUTPATIENT
Start: 2025-05-07 | End: 2025-05-07

## 2025-05-07 RX ORDER — OXYTOCIN-SODIUM CHLORIDE 0.9% IV SOLN 30 UNIT/500ML 30-0.9/5 UT/ML-%
SOLUTION INTRAVENOUS
Qty: 30 | Refills: 0 | Status: DISCONTINUED | OUTPATIENT
Start: 2025-05-07 | End: 2025-05-08

## 2025-05-07 RX ORDER — OXYTOCIN-SODIUM CHLORIDE 0.9% IV SOLN 30 UNIT/500ML 30-0.9/5 UT/ML-%
167 SOLUTION INTRAVENOUS
Qty: 30 | Refills: 0 | Status: DISCONTINUED | OUTPATIENT
Start: 2025-05-07 | End: 2025-05-09

## 2025-05-07 RX ORDER — SODIUM CHLORIDE 9 G/1000ML
1000 INJECTION, SOLUTION INTRAVENOUS
Refills: 0 | Status: DISCONTINUED | OUTPATIENT
Start: 2025-05-07 | End: 2025-05-08

## 2025-05-07 RX ORDER — MOMETASONE FUROATE 220 UG/1
1 INHALANT RESPIRATORY (INHALATION) DAILY
Refills: 0 | Status: DISCONTINUED | OUTPATIENT
Start: 2025-05-07 | End: 2025-05-08

## 2025-05-07 RX ORDER — ALBUTEROL SULFATE 2.5 MG/3ML
2 VIAL, NEBULIZER (ML) INHALATION EVERY 6 HOURS
Refills: 0 | Status: DISCONTINUED | OUTPATIENT
Start: 2025-05-07 | End: 2025-05-10

## 2025-05-07 RX ORDER — CITRIC ACID/SODIUM CITRATE 300-500 MG
15 SOLUTION, ORAL ORAL EVERY 6 HOURS
Refills: 0 | Status: DISCONTINUED | OUTPATIENT
Start: 2025-05-07 | End: 2025-05-08

## 2025-05-07 RX ADMIN — OXYTOCIN-SODIUM CHLORIDE 0.9% IV SOLN 30 UNIT/500ML 2 MILLIUNIT(S)/MIN: 30-0.9/5 SOLUTION at 17:26

## 2025-05-07 RX ADMIN — SODIUM CHLORIDE 125 MILLILITER(S): 9 INJECTION, SOLUTION INTRAVENOUS at 15:41

## 2025-05-08 DIAGNOSIS — O42.10 PREMATURE RUPTURE OF MEMBRANES, ONSET OF LABOR MORE THAN 24 HOURS FOLLOWING RUPTURE, UNSPECIFIED WEEKS OF GESTATION: ICD-10-CM

## 2025-05-08 LAB — T PALLIDUM AB TITR SER: NEGATIVE — SIGNIFICANT CHANGE UP

## 2025-05-08 PROCEDURE — 59025 FETAL NON-STRESS TEST: CPT | Mod: 26

## 2025-05-08 PROCEDURE — 59400 OBSTETRICAL CARE: CPT | Mod: U7

## 2025-05-08 RX ORDER — MOMETASONE FUROATE 220 UG/1
1 INHALANT RESPIRATORY (INHALATION) DAILY
Refills: 0 | Status: DISCONTINUED | OUTPATIENT
Start: 2025-05-08 | End: 2025-05-08

## 2025-05-08 RX ORDER — SIMETHICONE 80 MG
80 TABLET,CHEWABLE ORAL EVERY 4 HOURS
Refills: 0 | Status: DISCONTINUED | OUTPATIENT
Start: 2025-05-08 | End: 2025-05-10

## 2025-05-08 RX ORDER — OXYTOCIN-SODIUM CHLORIDE 0.9% IV SOLN 30 UNIT/500ML 30-0.9/5 UT/ML-%
167 SOLUTION INTRAVENOUS
Qty: 30 | Refills: 0 | Status: DISCONTINUED | OUTPATIENT
Start: 2025-05-08 | End: 2025-05-09

## 2025-05-08 RX ORDER — ACETAMINOPHEN 500 MG/5ML
975 LIQUID (ML) ORAL
Refills: 0 | Status: DISCONTINUED | OUTPATIENT
Start: 2025-05-08 | End: 2025-05-10

## 2025-05-08 RX ORDER — PRAMOXINE HCL 1 %
1 GEL (GRAM) TOPICAL EVERY 4 HOURS
Refills: 0 | Status: DISCONTINUED | OUTPATIENT
Start: 2025-05-08 | End: 2025-05-10

## 2025-05-08 RX ORDER — HYDROCORTISONE 10 MG/G
1 CREAM TOPICAL EVERY 6 HOURS
Refills: 0 | Status: DISCONTINUED | OUTPATIENT
Start: 2025-05-08 | End: 2025-05-10

## 2025-05-08 RX ORDER — DIPHENHYDRAMINE HCL 12.5MG/5ML
25 ELIXIR ORAL EVERY 6 HOURS
Refills: 0 | Status: DISCONTINUED | OUTPATIENT
Start: 2025-05-08 | End: 2025-05-10

## 2025-05-08 RX ORDER — IBUPROFEN 200 MG
600 TABLET ORAL EVERY 6 HOURS
Refills: 0 | Status: COMPLETED | OUTPATIENT
Start: 2025-05-08 | End: 2026-04-06

## 2025-05-08 RX ORDER — PRENATAL 136/IRON/FOLIC ACID 27 MG-1 MG
1 TABLET ORAL DAILY
Refills: 0 | Status: DISCONTINUED | OUTPATIENT
Start: 2025-05-08 | End: 2025-05-10

## 2025-05-08 RX ORDER — OXYCODONE HYDROCHLORIDE 30 MG/1
5 TABLET ORAL
Refills: 0 | Status: DISCONTINUED | OUTPATIENT
Start: 2025-05-08 | End: 2025-05-10

## 2025-05-08 RX ORDER — MOMETASONE FUROATE 220 UG/1
1 INHALANT RESPIRATORY (INHALATION) DAILY
Refills: 0 | Status: DISCONTINUED | OUTPATIENT
Start: 2025-05-08 | End: 2025-05-10

## 2025-05-08 RX ORDER — KETOROLAC TROMETHAMINE 30 MG/ML
30 INJECTION, SOLUTION INTRAMUSCULAR; INTRAVENOUS ONCE
Refills: 0 | Status: DISCONTINUED | OUTPATIENT
Start: 2025-05-08 | End: 2025-05-08

## 2025-05-08 RX ORDER — WITCH HAZEL LEAF
1 FLUID EXTRACT MISCELLANEOUS EVERY 4 HOURS
Refills: 0 | Status: DISCONTINUED | OUTPATIENT
Start: 2025-05-08 | End: 2025-05-10

## 2025-05-08 RX ORDER — MODIFIED LANOLIN 100 %
1 CREAM (GRAM) TOPICAL EVERY 6 HOURS
Refills: 0 | Status: DISCONTINUED | OUTPATIENT
Start: 2025-05-08 | End: 2025-05-10

## 2025-05-08 RX ORDER — BENZOCAINE 220 MG/G
1 SPRAY, METERED PERIODONTAL EVERY 6 HOURS
Refills: 0 | Status: DISCONTINUED | OUTPATIENT
Start: 2025-05-08 | End: 2025-05-10

## 2025-05-08 RX ORDER — MAGNESIUM HYDROXIDE 400 MG/5ML
30 SUSPENSION ORAL
Refills: 0 | Status: DISCONTINUED | OUTPATIENT
Start: 2025-05-08 | End: 2025-05-10

## 2025-05-08 RX ORDER — OXYCODONE HYDROCHLORIDE 30 MG/1
5 TABLET ORAL ONCE
Refills: 0 | Status: DISCONTINUED | OUTPATIENT
Start: 2025-05-08 | End: 2025-05-10

## 2025-05-08 RX ORDER — DIBUCAINE 10 MG/G
1 OINTMENT TOPICAL EVERY 6 HOURS
Refills: 0 | Status: DISCONTINUED | OUTPATIENT
Start: 2025-05-08 | End: 2025-05-10

## 2025-05-08 RX ADMIN — KETOROLAC TROMETHAMINE 30 MILLIGRAM(S): 30 INJECTION, SOLUTION INTRAMUSCULAR; INTRAVENOUS at 21:40

## 2025-05-08 RX ADMIN — MOMETASONE FUROATE 1 PUFF(S): 220 INHALANT RESPIRATORY (INHALATION) at 02:47

## 2025-05-08 RX ADMIN — SODIUM CHLORIDE 125 MILLILITER(S): 9 INJECTION, SOLUTION INTRAVENOUS at 15:07

## 2025-05-08 RX ADMIN — OXYTOCIN-SODIUM CHLORIDE 0.9% IV SOLN 30 UNIT/500ML 2 MILLIUNIT(S)/MIN: 30-0.9/5 SOLUTION at 15:07

## 2025-05-08 RX ADMIN — Medication 1 APPLICATION(S): at 15:07

## 2025-05-08 RX ADMIN — KETOROLAC TROMETHAMINE 30 MILLIGRAM(S): 30 INJECTION, SOLUTION INTRAMUSCULAR; INTRAVENOUS at 21:09

## 2025-05-08 RX ADMIN — Medication 2 PUFF(S): at 01:02

## 2025-05-08 RX ADMIN — SODIUM CHLORIDE 125 MILLILITER(S): 9 INJECTION, SOLUTION INTRAVENOUS at 04:29

## 2025-05-09 ENCOUNTER — TRANSCRIPTION ENCOUNTER (OUTPATIENT)
Age: 35
End: 2025-05-09

## 2025-05-09 ENCOUNTER — APPOINTMENT (OUTPATIENT)
Dept: PULMONOLOGY | Facility: CLINIC | Age: 35
End: 2025-05-09

## 2025-05-09 ENCOUNTER — APPOINTMENT (OUTPATIENT)
Facility: CLINIC | Age: 35
End: 2025-05-09

## 2025-05-09 RX ORDER — IBUPROFEN 200 MG
600 TABLET ORAL EVERY 6 HOURS
Refills: 0 | Status: DISCONTINUED | OUTPATIENT
Start: 2025-05-09 | End: 2025-05-10

## 2025-05-09 RX ADMIN — Medication 975 MILLIGRAM(S): at 07:30

## 2025-05-09 RX ADMIN — Medication 600 MILLIGRAM(S): at 23:46

## 2025-05-09 RX ADMIN — Medication 600 MILLIGRAM(S): at 17:38

## 2025-05-09 RX ADMIN — Medication 975 MILLIGRAM(S): at 01:40

## 2025-05-09 RX ADMIN — Medication 1 TABLET(S): at 12:41

## 2025-05-09 RX ADMIN — Medication 3 MILLILITER(S): at 14:09

## 2025-05-09 RX ADMIN — Medication 3 MILLILITER(S): at 22:00

## 2025-05-09 RX ADMIN — Medication 600 MILLIGRAM(S): at 18:18

## 2025-05-09 RX ADMIN — Medication 975 MILLIGRAM(S): at 06:36

## 2025-05-09 RX ADMIN — Medication 600 MILLIGRAM(S): at 10:15

## 2025-05-09 RX ADMIN — Medication 600 MILLIGRAM(S): at 09:37

## 2025-05-09 RX ADMIN — Medication 975 MILLIGRAM(S): at 00:43

## 2025-05-10 VITALS
RESPIRATION RATE: 16 BRPM | TEMPERATURE: 98 F | DIASTOLIC BLOOD PRESSURE: 73 MMHG | SYSTOLIC BLOOD PRESSURE: 113 MMHG | OXYGEN SATURATION: 100 % | HEART RATE: 62 BPM

## 2025-05-10 RX ORDER — MOMETASONE FUROATE 220 UG/1
1 INHALANT RESPIRATORY (INHALATION)
Qty: 0 | Refills: 0 | DISCHARGE
Start: 2025-05-10

## 2025-05-10 RX ORDER — IBUPROFEN 200 MG
1 TABLET ORAL
Qty: 0 | Refills: 0 | DISCHARGE
Start: 2025-05-10

## 2025-05-10 RX ORDER — ACETAMINOPHEN 500 MG/5ML
3 LIQUID (ML) ORAL
Qty: 0 | Refills: 0 | DISCHARGE
Start: 2025-05-10

## 2025-05-10 RX ORDER — PRENATAL 136/IRON/FOLIC ACID 27 MG-1 MG
1 TABLET ORAL
Qty: 0 | Refills: 0 | DISCHARGE
Start: 2025-05-10

## 2025-05-10 RX ADMIN — Medication 1 TABLET(S): at 12:09

## 2025-05-10 RX ADMIN — Medication 600 MILLIGRAM(S): at 13:00

## 2025-05-10 RX ADMIN — Medication 600 MILLIGRAM(S): at 00:40

## 2025-05-10 RX ADMIN — Medication 3 MILLILITER(S): at 06:00

## 2025-05-10 RX ADMIN — Medication 600 MILLIGRAM(S): at 19:00

## 2025-05-10 RX ADMIN — Medication 975 MILLIGRAM(S): at 07:00

## 2025-05-10 RX ADMIN — Medication 600 MILLIGRAM(S): at 18:20

## 2025-05-10 RX ADMIN — Medication 975 MILLIGRAM(S): at 05:57

## 2025-05-10 RX ADMIN — Medication 600 MILLIGRAM(S): at 12:09

## 2025-06-11 ENCOUNTER — APPOINTMENT (OUTPATIENT)
Facility: CLINIC | Age: 35
End: 2025-06-11
Payer: MEDICAID

## 2025-06-11 VITALS — BODY MASS INDEX: 24.48 KG/M2 | DIASTOLIC BLOOD PRESSURE: 76 MMHG | SYSTOLIC BLOOD PRESSURE: 121 MMHG | WEIGHT: 161 LBS

## 2025-06-11 PROCEDURE — 96127 BRIEF EMOTIONAL/BEHAV ASSMT: CPT

## 2025-06-11 PROCEDURE — 99401 PREV MED CNSL INDIV APPRX 15: CPT

## 2025-08-04 PROBLEM — Z34.93 ENCOUNTER FOR SUPERVISION OF LOW-RISK PREGNANCY IN THIRD TRIMESTER: Status: ACTIVE | Noted: 2025-08-04 | Resolved: 2026-01-01

## 2025-08-13 ENCOUNTER — LABORATORY RESULT (OUTPATIENT)
Age: 35
End: 2025-08-13

## 2025-08-13 ENCOUNTER — APPOINTMENT (OUTPATIENT)
Facility: CLINIC | Age: 35
End: 2025-08-13

## 2025-08-13 ENCOUNTER — ASOB RESULT (OUTPATIENT)
Age: 35
End: 2025-08-13

## 2025-08-13 VITALS — DIASTOLIC BLOOD PRESSURE: 81 MMHG | SYSTOLIC BLOOD PRESSURE: 123 MMHG

## 2025-08-13 DIAGNOSIS — R10.2 PELVIC AND PERINEAL PAIN: ICD-10-CM

## 2025-08-13 DIAGNOSIS — D25.9 LEIOMYOMA OF UTERUS, UNSPECIFIED: ICD-10-CM

## 2025-08-13 DIAGNOSIS — Z01.411 ENCOUNTER FOR GYNECOLOGICAL EXAMINATION (GENERAL) (ROUTINE) WITH ABNORMAL FINDINGS: ICD-10-CM

## 2025-08-13 LAB — HCG UR QL: NEGATIVE

## 2025-08-13 PROCEDURE — 99395 PREV VISIT EST AGE 18-39: CPT | Mod: 25

## 2025-08-13 PROCEDURE — 76830 TRANSVAGINAL US NON-OB: CPT

## 2025-08-13 PROCEDURE — 76856 US EXAM PELVIC COMPLETE: CPT | Mod: 59

## 2025-08-13 PROCEDURE — G0444 DEPRESSION SCREEN ANNUAL: CPT | Mod: 59

## 2025-08-13 PROCEDURE — 99214 OFFICE O/P EST MOD 30 MIN: CPT | Mod: 25

## 2025-08-13 PROCEDURE — 99459 PELVIC EXAMINATION: CPT

## 2025-08-13 PROCEDURE — 81025 URINE PREGNANCY TEST: CPT

## 2025-08-17 PROBLEM — D25.9 FIBROID UTERUS: Status: ACTIVE | Noted: 2025-08-04

## 2025-08-17 PROBLEM — R10.2 PELVIC PAIN IN FEMALE: Status: ACTIVE | Noted: 2025-08-17 | Resolved: 2025-09-16

## 2025-08-17 PROBLEM — Z01.411 ENCOUNTER FOR GYNECOLOGICAL EXAMINATION (GENERAL) (ROUTINE) WITH ABNORMAL FINDINGS: Status: ACTIVE | Noted: 2025-08-04

## 2025-08-20 LAB
CYTOLOGY CVX/VAG DOC THIN PREP: ABNORMAL
HPV HIGH+LOW RISK DNA PNL CVX: DETECTED